# Patient Record
Sex: FEMALE | Race: WHITE | NOT HISPANIC OR LATINO | Employment: PART TIME | URBAN - METROPOLITAN AREA
[De-identification: names, ages, dates, MRNs, and addresses within clinical notes are randomized per-mention and may not be internally consistent; named-entity substitution may affect disease eponyms.]

---

## 2018-07-08 ENCOUNTER — OFFICE VISIT (OUTPATIENT)
Dept: URGENT CARE | Facility: CLINIC | Age: 38
End: 2018-07-08
Payer: COMMERCIAL

## 2018-07-08 VITALS
BODY MASS INDEX: 21.2 KG/M2 | SYSTOLIC BLOOD PRESSURE: 107 MMHG | RESPIRATION RATE: 18 BRPM | HEART RATE: 85 BPM | DIASTOLIC BLOOD PRESSURE: 68 MMHG | TEMPERATURE: 99 F | HEIGHT: 60 IN | OXYGEN SATURATION: 95 % | WEIGHT: 108 LBS

## 2018-07-08 DIAGNOSIS — M94.0 COSTOCHONDRITIS: ICD-10-CM

## 2018-07-08 DIAGNOSIS — Z76.89 ENCOUNTER TO ESTABLISH CARE: ICD-10-CM

## 2018-07-08 DIAGNOSIS — L02.411 CUTANEOUS ABSCESSES OF BOTH AXILLAE: Primary | ICD-10-CM

## 2018-07-08 DIAGNOSIS — L02.412 CUTANEOUS ABSCESSES OF BOTH AXILLAE: Primary | ICD-10-CM

## 2018-07-08 PROCEDURE — 3008F BODY MASS INDEX DOCD: CPT | Mod: CPTII,S$GLB,, | Performed by: PHYSICIAN ASSISTANT

## 2018-07-08 PROCEDURE — 99203 OFFICE O/P NEW LOW 30 MIN: CPT | Mod: S$GLB,,, | Performed by: PHYSICIAN ASSISTANT

## 2018-07-08 RX ORDER — SULFAMETHOXAZOLE AND TRIMETHOPRIM 800; 160 MG/1; MG/1
1 TABLET ORAL 2 TIMES DAILY
Qty: 20 TABLET | Refills: 0 | Status: SHIPPED | OUTPATIENT
Start: 2018-07-08 | End: 2018-07-13 | Stop reason: ALTCHOICE

## 2018-07-08 RX ORDER — LAMOTRIGINE 100 MG/1
100 TABLET ORAL DAILY
COMMUNITY
End: 2018-07-30 | Stop reason: SDUPTHER

## 2018-07-08 RX ORDER — MUPIROCIN 20 MG/G
OINTMENT TOPICAL
Qty: 22 G | Refills: 1 | Status: SHIPPED | OUTPATIENT
Start: 2018-07-08 | End: 2018-08-03

## 2018-07-08 NOTE — PATIENT INSTRUCTIONS
Abscess (Antibiotic Treatment Only)  An abscess (sometimes called a boil) happens when bacteria get trapped under the skin and start to grow. Pus forms inside the abscess as the body responds to the bacteria. An abscess can happen with an insect bite, ingrown hair, blocked oil gland, pimple, cyst, or puncture wound.  In the early stages, your wound may be red and tender. For this stage, you may get antibiotics. If the abscess does not get better with antibiotics, it will need to be drained with a small cut.  Home care  These tips will help you care for your abscess at home:  · Soak the wound in hot water or apply hot packs (small towel soaked in hot water) to the area for 20 minutes at a time. Do this 3 to 4 times a day.  · Do not cut, squeeze, or pop the boil yourself.  · Apply antibiotic cream or ointment to the skin 3 to 4 times a day, unless something else was prescribed. Some ointments include an antibiotic plus a pain reliever.  · If your doctor prescribed antibiotics, do not stop taking them until you have finished the medicine or the doctor tells you to stop.  · You may use an over-the-counter pain medicine to control pain, unless another pain medicine was prescribed. If you have chronic liver or kidney disease or ever had a stomach ulcer or gastrointestinal bleeding, talk with your doctor before using these any of these.  Follow-up care  Follow up with your healthcare provider, or as advised. Check your wound each day for the signs of worsening infection listed below.  When to seek medical advice  Get prompt medical attention if any of these occur:  · An increase in redness or swelling  · Red streaks in the skin leading away from the abscess  · An increase in local pain or swelling  · Fever of 100.4ºF (38ºC) or higher, or as directed by your healthcare provider  · Pus or fluid coming from the abscess  · Boil returns after getting better  Date Last Reviewed: 9/1/2016  © 6285-9032 [employer],  [employer]. [address] [place] [id number]. All rights reserved. This information is not intended as a substitute for professional medical care. Always follow your healthcare professional's instructions.        Costochondritis    Costochondritis is inflammation of a rib or the cartilage that connects a rib to your breastbone (sternum). It causes tenderness, and sometimes chest pain may be sharp or aching, or it may feel like pressure. Pain may get worse with deep breathing, movement, or exercise. In some cases, the pain is mistaken for a heart attack. Despite this, the condition is not serious. Read on to learn more about the condition and how it can be treated.  What causes costochondritis?  The cause of costochondritis is not completely clear, but it may happen after a chest injury, chest infection, or coughing episode. Some physical activities can sometimes lead to costochondritis. Large-breasted women may be more likely to have the condition. Often, the reason for the inflammation is unknown.  Diagnosing costochondritis  There is no test for costochondritis. The condition is diagnosed by the symptoms you have. Your healthcare provider will perform a physical exam. He or she will ask you about your symptoms and examine your chest for tenderness. In some cases, tests are done to rule out more serious problems. These tests may include imaging tests such as chest X-ray, CT scan, or an ECG.  Treating costochondritis  If an underlying cause is found, treatment for that will likely relieve the problem. Costochondritis often goes away on its own. The course of the condition varies from person to person. It usually lasts from weeks to months. In some cases, mild symptoms continue for months to years. To ease symptoms:  · Take medicine as directed. These relieve pain and swelling. Ibuprofen or other NSAIDs are often recommended. In some cases, you may be given prescription medicine, such as muscle relaxants.  · Avoid  activities that put stress on the chest or spine.  · Apply a heating pad (set to warm, not too high, heat) to the breastbone several times a day.  · Perform stretching exercises as directed.  Call the healthcare provider right away if you have any of the following:  · Pain that is not relieved by medicine  · Shortness of breath  · Lightheadedness, dizziness, or fainting  · Feeling of irregular heartbeat or fast pulse  Anyone with chest pain should see a healthcare provider, especially those who are older and may be at risk for heart disease.   Date Last Reviewed: 10/1/2016  © 8180-8527 [employer]. [address] [place] [address]. All rights reserved. This information is not intended as a substitute for professional medical care. Always follow your healthcare professional's instructions.      Please follow up with your Primary care provider within 2-5 days if your signs and symptoms have not resolved or worsen.     If your condition worsens or fails to improve we recommend that you receive another evaluation at the emergency room immediately or contact your primary medical clinic to discuss your concerns.   You must understand that you have received an Urgent Care treatment only and that you may be released before all of your medical problems are known or treated. You, the patient, will arrange for follow up care as instructed.     RED FLAGS/WARNING SYMPTOMS DISCUSSED WITH PATIENT THAT WOULD WARRANT EMERGENT MEDICAL ATTENTION. PATIENT VERBALIZED UNDERSTANDING.

## 2018-07-08 NOTE — PROGRESS NOTES
Subjective:       Patient ID: Greta Kumar is a 38 y.o. female.    Vitals:  height is 5' (1.524 m) and weight is 49 kg (108 lb). Her temperature is 98.6 °F (37 °C). Her blood pressure is 107/68 and her pulse is 85. Her respiration is 18 and oxygen saturation is 95%.     Chief Complaint: Rash (BILATERAL AXILLARY REGIONS) and Chest Pain    PT C/O PAINFUL RASHES ON THE BILATERAL AXILLARY REGIONS OF HER BODY, AS WELL AS CHEST PAIN.  THE RASHES STARTED ABOUT 1.5 WEEKS AGO, AND HAS BEEN GRADUALLY WORSENING.  THE CHEST PAIN HAS STARTED TODAY.  PT STATES HER  HAD A RECENT STAPH INFECTION WHICH HE PUT HIS BATHING SPONGE ON TOP OF THE BAR OF SOAP THAT THE PT HAS USED.      Rash   This is a new problem. The current episode started 1 to 4 weeks ago (1.5 WEEKS AGO). The problem has been gradually worsening since onset. The affected locations include the right axilla and left axilla. The rash is characterized by pain, redness and swelling. She was exposed to an ill contact. Pertinent negatives include no cough, fever, joint pain, shortness of breath, sore throat or vomiting. Past treatments include antibiotic cream. The treatment provided no relief. Her past medical history is significant for varicella. There is no history of allergies, asthma or eczema.   Chest Pain    This is a new problem. The current episode started today. The onset quality is gradual. The problem occurs constantly. The problem has been unchanged. The pain is present in the substernal region. The pain is at a severity of 5/10. The pain is moderate. Quality: aching pain. The pain does not radiate. Pertinent negatives include no abdominal pain, back pain, claudication, cough, diaphoresis, dizziness, exertional chest pressure, fever, headaches, hemoptysis, irregular heartbeat, leg pain, lower extremity edema, malaise/fatigue, nausea, near-syncope, numbness, orthopnea, palpitations, PND, shortness of breath, sputum production, syncope, vomiting or  weakness. The pain is aggravated by movement and deep breathing. She has tried nothing for the symptoms.     Review of Systems   Constitution: Negative for chills, diaphoresis, fever, weakness and malaise/fatigue.   HENT: Negative for hoarse voice and sore throat.    Eyes: Negative for blurred vision.   Cardiovascular: Positive for chest pain. Negative for claudication, irregular heartbeat, leg swelling, near-syncope, orthopnea, palpitations, paroxysmal nocturnal dyspnea and syncope.   Respiratory: Negative for cough, hemoptysis, shortness of breath and sputum production.    Skin: Positive for rash. Negative for itching.        painful   Musculoskeletal: Negative for back pain and joint pain.   Gastrointestinal: Negative for abdominal pain, nausea and vomiting.   Neurological: Negative for dizziness, headaches, light-headedness and numbness.   Psychiatric/Behavioral: The patient is not nervous/anxious.        Objective:      Physical Exam   Constitutional: She is oriented to person, place, and time. She appears well-developed and well-nourished. She is cooperative.  Non-toxic appearance. She does not appear ill. No distress.   HENT:   Head: Normocephalic and atraumatic. Head is without abrasion, without contusion and without laceration.   Right Ear: Hearing, tympanic membrane, external ear and ear canal normal.   Left Ear: Hearing, tympanic membrane, external ear and ear canal normal.   Nose: Nose normal. No mucosal edema, rhinorrhea or nasal deformity. No epistaxis. Right sinus exhibits no maxillary sinus tenderness and no frontal sinus tenderness. Left sinus exhibits no maxillary sinus tenderness and no frontal sinus tenderness.   Mouth/Throat: Uvula is midline, oropharynx is clear and moist and mucous membranes are normal. No trismus in the jaw. Normal dentition. No uvula swelling. No posterior oropharyngeal erythema.   Eyes: Conjunctivae, EOM and lids are normal. Pupils are equal, round, and reactive to light.  Right eye exhibits no discharge. Left eye exhibits no discharge. No scleral icterus.   Sclera clear bilat   Neck: Trachea normal, normal range of motion, full passive range of motion without pain and phonation normal. Neck supple.   Cardiovascular: Normal rate, regular rhythm, normal heart sounds, intact distal pulses and normal pulses.    Pulses:       Carotid pulses are 2+ on the right side, and 2+ on the left side.       Radial pulses are 2+ on the right side, and 2+ on the left side.        Popliteal pulses are 2+ on the right side, and 2+ on the left side.        Dorsalis pedis pulses are 2+ on the right side, and 2+ on the left side.   Pulmonary/Chest: Effort normal and breath sounds normal. No accessory muscle usage or stridor. No respiratory distress. She has no decreased breath sounds. She has no wheezes. She has no rhonchi. She has no rales. Chest wall is not dull to percussion. She exhibits tenderness and bony tenderness. She exhibits no mass, no laceration, no crepitus, no edema, no deformity, no swelling and no retraction.       Abdominal: Soft. Normal appearance and bowel sounds are normal. She exhibits no distension, no pulsatile midline mass and no mass. There is no tenderness.   Musculoskeletal: Normal range of motion. She exhibits no edema or deformity.   Neurological: She is alert and oriented to person, place, and time. She exhibits normal muscle tone. Coordination normal.   Skin: Skin is warm, dry and intact. Capillary refill takes less than 2 seconds. Lesion noted. No abrasion, no bruising, no burn, no ecchymosis, no laceration and no rash noted. She is not diaphoretic. There is erythema. No pallor.        Small areas of erythematous abscesses noted Bilateral axillas without drainage, oozing, or weeping. No edema noted.    Psychiatric: She has a normal mood and affect. Her speech is normal and behavior is normal. Judgment and thought content normal. Cognition and memory are normal.   Nursing  note and vitals reviewed.        XRAY: No acute cardiopulmonary processes noted    Assessment:       1. Cutaneous abscesses of both axillae    2. Costochondritis    3. Encounter to establish care        Plan:         Cutaneous abscesses of both axillae  -     sulfamethoxazole-trimethoprim 800-160mg (BACTRIM DS) 800-160 mg Tab; Take 1 tablet by mouth 2 (two) times daily. for 10 days  Dispense: 20 tablet; Refill: 0  -     mupirocin (BACTROBAN) 2 % ointment; Apply to affected area 3 times daily  Dispense: 22 g; Refill: 1  -     Ambulatory referral to Internal Medicine    Costochondritis  -     X-Ray Chest PA And Lateral; Future; Expected date: 07/08/2018    Encounter to establish care  -     Ambulatory referral to Internal Medicine        Abscess (Antibiotic Treatment Only)  An abscess (sometimes called a boil) happens when bacteria get trapped under the skin and start to grow. Pus forms inside the abscess as the body responds to the bacteria. An abscess can happen with an insect bite, ingrown hair, blocked oil gland, pimple, cyst, or puncture wound.  In the early stages, your wound may be red and tender. For this stage, you may get antibiotics. If the abscess does not get better with antibiotics, it will need to be drained with a small cut.  Home care  These tips will help you care for your abscess at home:  · Soak the wound in hot water or apply hot packs (small towel soaked in hot water) to the area for 20 minutes at a time. Do this 3 to 4 times a day.  · Do not cut, squeeze, or pop the boil yourself.  · Apply antibiotic cream or ointment to the skin 3 to 4 times a day, unless something else was prescribed. Some ointments include an antibiotic plus a pain reliever.  · If your doctor prescribed antibiotics, do not stop taking them until you have finished the medicine or the doctor tells you to stop.  · You may use an over-the-counter pain medicine to control pain, unless another pain medicine was prescribed. If you  have chronic liver or kidney disease or ever had a stomach ulcer or gastrointestinal bleeding, talk with your doctor before using these any of these.  Follow-up care  Follow up with your healthcare provider, or as advised. Check your wound each day for the signs of worsening infection listed below.  When to seek medical advice  Get prompt medical attention if any of these occur:  · An increase in redness or swelling  · Red streaks in the skin leading away from the abscess  · An increase in local pain or swelling  · Fever of 100.4ºF (38ºC) or higher, or as directed by your healthcare provider  · Pus or fluid coming from the abscess  · Boil returns after getting better  Date Last Reviewed: 9/1/2016  © 8848-0338 [employer]. [address] [place] [address], [place] [address]. All rights reserved. This information is not intended as a substitute for professional medical care. Always follow your healthcare professional's instructions.        Costochondritis    Costochondritis is inflammation of a rib or the cartilage that connects a rib to your breastbone (sternum). It causes tenderness, and sometimes chest pain may be sharp or aching, or it may feel like pressure. Pain may get worse with deep breathing, movement, or exercise. In some cases, the pain is mistaken for a heart attack. Despite this, the condition is not serious. Read on to learn more about the condition and how it can be treated.  What causes costochondritis?  The cause of costochondritis is not completely clear, but it may happen after a chest injury, chest infection, or coughing episode. Some physical activities can sometimes lead to costochondritis. Large-breasted women may be more likely to have the condition. Often, the reason for the inflammation is unknown.  Diagnosing costochondritis  There is no test for costochondritis. The condition is diagnosed by the symptoms you have. Your healthcare provider will perform a physical exam. He or she will  ask you about your symptoms and examine your chest for tenderness. In some cases, tests are done to rule out more serious problems. These tests may include imaging tests such as chest X-ray, CT scan, or an ECG.  Treating costochondritis  If an underlying cause is found, treatment for that will likely relieve the problem. Costochondritis often goes away on its own. The course of the condition varies from person to person. It usually lasts from weeks to months. In some cases, mild symptoms continue for months to years. To ease symptoms:  · Take medicine as directed. These relieve pain and swelling. Ibuprofen or other NSAIDs are often recommended. In some cases, you may be given prescription medicine, such as muscle relaxants.  · Avoid activities that put stress on the chest or spine.  · Apply a heating pad (set to warm, not too high, heat) to the breastbone several times a day.  · Perform stretching exercises as directed.  Call the healthcare provider right away if you have any of the following:  · Pain that is not relieved by medicine  · Shortness of breath  · Lightheadedness, dizziness, or fainting  · Feeling of irregular heartbeat or fast pulse  Anyone with chest pain should see a healthcare provider, especially those who are older and may be at risk for heart disease.   Date Last Reviewed: 10/1/2016  © 0010-2641 [employer]. [address] [place] [address], [place] [address]. All rights reserved. This information is not intended as a substitute for professional medical care. Always follow your healthcare professional's instructions.      Please follow up with your Primary care provider within 2-5 days if your signs and symptoms have not resolved or worsen.     If your condition worsens or fails to improve we recommend that you receive another evaluation at the emergency room immediately or contact your primary medical clinic to discuss your concerns.   You must understand that you have received an Urgent Care  treatment only and that you may be released before all of your medical problems are known or treated. You, the patient, will arrange for follow up care as instructed.     RED FLAGS/WARNING SYMPTOMS DISCUSSED WITH PATIENT THAT WOULD WARRANT EMERGENT MEDICAL ATTENTION. PATIENT VERBALIZED UNDERSTANDING.

## 2018-07-13 ENCOUNTER — OFFICE VISIT (OUTPATIENT)
Dept: INTERNAL MEDICINE | Facility: CLINIC | Age: 38
End: 2018-07-13
Payer: COMMERCIAL

## 2018-07-13 VITALS
HEART RATE: 60 BPM | HEIGHT: 60 IN | BODY MASS INDEX: 20.35 KG/M2 | WEIGHT: 103.63 LBS | DIASTOLIC BLOOD PRESSURE: 60 MMHG | SYSTOLIC BLOOD PRESSURE: 100 MMHG

## 2018-07-13 DIAGNOSIS — Z87.898 HISTORY OF SEIZURES: ICD-10-CM

## 2018-07-13 DIAGNOSIS — L73.2 HYDRADENITIS: Primary | ICD-10-CM

## 2018-07-13 PROCEDURE — 99203 OFFICE O/P NEW LOW 30 MIN: CPT | Mod: S$GLB,,, | Performed by: INTERNAL MEDICINE

## 2018-07-13 PROCEDURE — 99999 PR PBB SHADOW E&M-EST. PATIENT-LVL III: CPT | Mod: PBBFAC,,, | Performed by: INTERNAL MEDICINE

## 2018-07-13 PROCEDURE — 3008F BODY MASS INDEX DOCD: CPT | Mod: CPTII,S$GLB,, | Performed by: INTERNAL MEDICINE

## 2018-07-13 RX ORDER — CLINDAMYCIN HYDROCHLORIDE 300 MG/1
300 CAPSULE ORAL EVERY 8 HOURS
Qty: 30 CAPSULE | Refills: 0 | Status: SHIPPED | OUTPATIENT
Start: 2018-07-13 | End: 2018-07-23

## 2018-07-13 NOTE — PROGRESS NOTES
Pt. ID: Greta Kumar is a 38 y.o. female      Chief complaint:   Chief Complaint   Patient presents with    Mass       HPI: Pt. Here for B/L axillary lumps for past 3 weeks; she went to urgent care and was given bactrim and bactroban cream which does not help much; pt. , Driss, is with the pt.; LMP: birth control; pt. Does not want screening labs; pt. Has hx of seizures and she is on lamictal; she moved from [place] and she would like neurology in the area      Review of Systems   Constitutional: Negative for chills and fever.   Respiratory: Negative for cough and shortness of breath.    Cardiovascular: Negative for chest pain.   Gastrointestinal: Negative for nausea and vomiting.   Genitourinary: Negative for dysuria.   Skin:        Solitary lump to R axilla and multiple lumps to L axilla         Objective:    Physical Exam   Constitutional: She is oriented to person, place, and time.   Eyes: EOM are normal.   Neck: Normal range of motion.   Cardiovascular: Normal rate, regular rhythm and normal heart sounds.    Pulmonary/Chest: Effort normal and breath sounds normal.   Abdominal: Soft. There is no tenderness. There is no rebound and no guarding.   Musculoskeletal: Normal range of motion.   Neurological: She is alert and oriented to person, place, and time.   Skin: No rash noted.   R axilla: solitary, tender, marble size nodule to R axilla ; L axilla: multiple marble size, tender nodules; SHIRA snell Anna present during exam while pt. Was in a [facility]         Health Maintenance   Topic Date Due    Lipid Panel  1980    TETANUS VACCINE  02/22/1998    Pap Smear with HPV Cotest  02/22/2001    Influenza Vaccine  08/01/2018         Assessment:     1. Hydradenitis Active   2. History of seizures Well controlled         Plan: Hydradenitis  Comments:  start warm compresses and change bactrim to clindamycin x 10 days; refer to surgery   Orders:  -     Ambulatory consult to General Surgery  -      clindamycin (CLEOCIN) 300 MG capsule; Take 1 capsule (300 mg total) by mouth every 8 (eight) hours. for 10 days  Dispense: 30 capsule; Refill: 0    History of seizures  Comments:  continue current regimen and refer to neurology in the area  Orders:  -     Ambulatory consult to Neurology        Problem List Items Addressed This Visit        Neuro    History of seizures    Overview     continue current regimen and refer to neurology in the area         Relevant Orders    Ambulatory consult to Neurology       Derm    Hydradenitis - Primary    Relevant Medications    clindamycin (CLEOCIN) 300 MG capsule    Other Relevant Orders    Ambulatory consult to General Surgery

## 2018-07-30 ENCOUNTER — OFFICE VISIT (OUTPATIENT)
Dept: NEUROLOGY | Facility: CLINIC | Age: 38
End: 2018-07-30
Payer: COMMERCIAL

## 2018-07-30 ENCOUNTER — CLINICAL SUPPORT (OUTPATIENT)
Dept: CARDIOLOGY | Facility: CLINIC | Age: 38
End: 2018-07-30
Attending: PSYCHIATRY & NEUROLOGY
Payer: COMMERCIAL

## 2018-07-30 VITALS
SYSTOLIC BLOOD PRESSURE: 104 MMHG | HEIGHT: 60 IN | WEIGHT: 106.5 LBS | DIASTOLIC BLOOD PRESSURE: 67 MMHG | BODY MASS INDEX: 20.91 KG/M2 | HEART RATE: 70 BPM

## 2018-07-30 DIAGNOSIS — Z51.81 THERAPEUTIC DRUG MONITORING: ICD-10-CM

## 2018-07-30 DIAGNOSIS — I80.9 PHLEBITIS: ICD-10-CM

## 2018-07-30 DIAGNOSIS — Z87.898 HISTORY OF SEIZURES: ICD-10-CM

## 2018-07-30 DIAGNOSIS — G40.909 SEIZURE DISORDER: ICD-10-CM

## 2018-07-30 DIAGNOSIS — I80.8 PHLEBITIS OF RIGHT UPPER EXTREMITY: Primary | ICD-10-CM

## 2018-07-30 DIAGNOSIS — F32.A ANXIETY AND DEPRESSION: ICD-10-CM

## 2018-07-30 DIAGNOSIS — I80.9 PHLEBITIS: Primary | ICD-10-CM

## 2018-07-30 DIAGNOSIS — F41.9 ANXIETY AND DEPRESSION: ICD-10-CM

## 2018-07-30 PROCEDURE — 99205 OFFICE O/P NEW HI 60 MIN: CPT | Mod: S$GLB,,, | Performed by: PSYCHIATRY & NEUROLOGY

## 2018-07-30 PROCEDURE — 99999 PR PBB SHADOW E&M-EST. PATIENT-LVL III: CPT | Mod: PBBFAC,,, | Performed by: PSYCHIATRY & NEUROLOGY

## 2018-07-30 PROCEDURE — 93971 EXTREMITY STUDY: CPT | Mod: S$GLB,,, | Performed by: INTERNAL MEDICINE

## 2018-07-30 PROCEDURE — 3008F BODY MASS INDEX DOCD: CPT | Mod: CPTII,S$GLB,, | Performed by: PSYCHIATRY & NEUROLOGY

## 2018-07-30 RX ORDER — LAMOTRIGINE 100 MG/1
100 TABLET ORAL DAILY
Qty: 90 TABLET | Refills: 3 | Status: SHIPPED | OUTPATIENT
Start: 2018-07-30 | End: 2019-08-22 | Stop reason: SDUPTHER

## 2018-07-30 RX ORDER — CITALOPRAM 20 MG/1
20 TABLET, FILM COATED ORAL DAILY
Qty: 30 TABLET | Refills: 11 | Status: SHIPPED | OUTPATIENT
Start: 2018-07-30 | End: 2018-08-03

## 2018-07-30 NOTE — PROGRESS NOTES
[facility] NEUROLOGY  [facility] [place]    Date: 7/30/18  Patient Name: Greta Kumar   MRN: [id number]   PCP: Primary Doctor No  Referring Provider: Self, Aaareferral    Assessment:      This is Greta Kumar, 38 y.o. female who presents with right upper extremity pain and palpable cord on exam in the setting of hidradenitis suppurative a.  Will obtain upper extremity ultrasound to rule out phlebitis.  Have urged to follow up with her primary care physician.  Patient does not have weakness on exam suggestive of underlying plexus injury.  Even a plexus Sharad were to be considered, patient has been only symptomatic for a week limiting utility of EMG.  Will attempt to obtain outside records for MRI and EEG reports.  Will make no changes to her current Lamictal today and obtain baseline level.     Plan:      Problem List Items Addressed This Visit        Neuro    History of seizures    Current Assessment & Plan     -- continue current lamictal 100 mg daily  -- lamictal level and routine labs today  -- obtaining OSH EEG and MRI records              Psychiatric    Anxiety and depression    Current Assessment & Plan     Trial of celexa 20 mg daily           Other Visit Diagnoses     Phlebitis of right upper extremity    -  Primary    Relevant Orders    Ultrasound doppler venous arm right (CUPID ONLY)    Ultrasound doppler arterial arm right (CUPID ONLY)    VAS US Arterial Arm Right    VAS US Venous Arm Left    Seizure disorder        Relevant Orders    Lamotrigine level    CBC auto differential    Comprehensive metabolic panel    Therapeutic drug monitoring        Relevant Orders    Lamotrigine level    CBC auto differential    Comprehensive metabolic panel        I completed education on seizure first aid and safety. I recommended seizure precautions with regards to avoiding unsupervised water recreational activity or bathing in tubs, climbing or working at heights, operation of heavy or  dangerous machinery, caution around fire and sources of high heat, as well as any other activity which could put a patient at danger in case of a seizure.  I also reviewed the [facility] law and recommended that the patient not drive for 6 months in the event of a breakthrough seizure.    As the patient is a female of childbearing age, I have counseled the patient on issues pertaining to pregnancy and epilepsy and recommended folic acid supplementation. Patient currently has a Mirena.    Sergio Tenorio MD  [facility]   Department of Neurology    Patient note was created using Dragon Dictation.  Any errors in syntax or even information may not have been identified and edited on initial review prior to signing this note.  Subjective:        HPI:   Ms. Greta Kumar is a 38 y.o. female who presents with a chief complaint of right arm pain and history of seizures.  Patient presents today with her  who contributes to the history.  The patient reports that approximately a week ago she was diagnosed with hidradenitis suppurative of.  She states that she believes she has developed nerve damage in her right upper extremity as result of the hidradenitis suppurativa.  She reports point tenderness made worse with movement, erythema, and prominent, palpable  blood vessels in her axilla since her diagnosis. She does state that occasionally she experiences a burning and tingling pain in patchy areas over her right medial axilla and biceps.  She states that she was recommended to undergo surgery for treatment of her HS but has declined this.  She also reports a longstanding history of epilepsy stating that she was diagnosed at age 14 and has experienced infrequent generalized tonic-clonic seizures since that time.  She states she has been seizure-free for the last 2 years and states she is well controlled currently on Lamictal    Seizure Type: Unknown  Seizure Etiology: Unknown  Current AEDs: Lamictal 100 mg  "daily    The patient is accompanied by family who contribute to the history. This patient has 1 types of seizure as described below. The patient reports having seizures for years. The patient reports to have  seizure control. The seizure frequency is ~1 every 1-2 years. The last seizure was 2 years ago . The patient reports no side effects from seizure medication.     Seizure Type 1:   Seizure Description: Generalized shaking, looks like gasping for air, last 30s-minute, may 1-2  Aura: "Feel spacey" "nothing make sense"  Associated Symptoms: Bites tongue, urinary incontinence  Seizure Frequency: Monthly, prior that 1/yr  Last seizure: 2 years ago    Handedness: Left  Seizure Triggers/ Provoking Features: stress   Seizure Onset Age: 14  Seizure/ Epilepsy Risk Factors: Paternal grandmother has epilepsy  Birth/Developmental History: Normal birth and developmental history  Previous Seizure Medications: PB, PHT, CBZ, LTG, LEV  Other Treatments: None  Episodes of Status: None  Recent Med Changes: None  Psychiatric/Behavioral Comorbitidies: None  Surgical Candidacy: None    Prior Studies:  EEG : Ambulatory EEG done 2 years- normal per patient  vEEG/ EMU evaluation: Not done  MRI of brain: Done per patient, normal per patient  Other studies: Not done  AED levels: Not done  CT/CTA Scan:  Not dome  PET Scan: Not done  Neuropsychological Evaluation: Not done  DEXA Scan: Not done     PAST MEDICAL HISTORY:  Past Medical History:   Diagnosis Date    Epilepsy     Seizures        PAST SURGICAL HISTORY:  History reviewed. No pertinent surgical history.    CURRENT MEDS:  Current Outpatient Prescriptions   Medication Sig Dispense Refill    lamoTRIgine (LAMICTAL) 100 MG tablet Take 100 mg by mouth once daily.      mupirocin (BACTROBAN) 2 % ointment Apply to affected area 3 times daily 22 g 1     No current facility-administered medications for this visit.        ALLERGIES:  Review of patient's allergies indicates:   Allergen " Reactions    Penicillins Hives       FAMILY HISTORY:  Family History   Problem Relation Age of Onset    Emphysema Mother     Epilepsy Paternal Grandmother     Cancer Paternal Grandmother        SOCIAL HISTORY:  Social History   Substance Use Topics    Smoking status: Never Smoker    Smokeless tobacco: Never Used    Alcohol use No       Review of Systems:  12 review of systems is negative except for the symptoms mentioned in HPI.        Objective:     Vitals:    07/30/18 0831   BP: 104/67   Pulse: 70   Weight: 48.3 kg (106 lb 7.7 oz)   Height: 5' (1.524 m)       General: NAD, well nourished   Eyes: no tearing, discharge, no erythema   ENT: moist mucous membranes of the oral cavity, nares patent    Neck: Supple, Full range of motion  Cardiovascular: Warm and well perfused, pulses equal and symmetrical  Lungs: Normal work of breathing, normal chest wall excursions  Skin: No rash, lesions, or breakdown on exposed skin  Psychiatry: Mood and affect are appropriate   Abdomen: soft, non tender, non distended  Extremeties: No cyanosis, clubbing or edema. Palpable cord in RUE.     Neurological   MENTAL STATUS: Alert and oriented to person, place, and time. Attention and concentration within normal limits. Speech without dysarthria, able to name and repeat without difficulty. Recent and remote memory within normal limits   CRANIAL NERVES: Visual fields intact. PERRL. EOMI. Facial sensation intact. Face symmetrical. Hearing grossly intact. Full shoulder shrug bilaterally. Tongue protrudes midline   SENSORY: Sensation is intact to light touch throughout.   MOTOR: Normal bulk and tone. No pronator drift.  5/5 deltoid, biceps, triceps, interosseous, hand  bilaterally. 5/5 iliopsoas, knee extension/flexion, foot dorsi/plantarflexion bilaterally.    REFLEXES: Symmetric and 2+ throughout. Toes down going bilaterally.   CEREBELLAR/COORDINATION/GAIT: Gait steady with normal arm swing and stride length.  Heel to shin intact.  Finger to nose intact. Normal rapid alternating movements.

## 2018-07-30 NOTE — PATIENT INSTRUCTIONS
First Aid: Seizures  A seizure results from a sudden rush of abnormal electrical signals in the brain. Symptoms may range from a minor daze to uncontrollable muscle spasms (convulsions). In many cases, the victim will lose consciousness. A seizure can be caused by a high fever, head injury, drug reaction, or condition such as epilepsy.  1. Protect the head  · Help the victim to the floor if he or she begins losing muscle control. Turn the person on his or her side to prevent choking.  · Protect the victim's head from injury by placing something soft, such as folded clothes, beneath it, and by moving objects away from the victim.  · Don't cause injury by restraining the person or by placing anything in his or her mouth.  · Remove eyeglasses.  2.  Preserve dignity  · Clear away bystanders.  · Reassure the victim, who may be confused, drowsy, or hostile when coming out of the seizure.  · Cover the person or provide dry clothes if muscle spasms have caused a loss of bladder control.  3. Check for injury  · Make sure the victim's mental state has returned to normal. One way to do this is to ask the person his or her name, the year, and your location.  · Injuries can occur to the head, mouth, tongue, or body.   4. Call 911  · If the seizure lasts longer than five minutes (Note: Timing the seizure and recovery time is helpful in many cases.)  · If a second seizure occurs  · If the victim doesnt regain consciousness  · If the victim is pregnant  · If the victim has no history of seizures  · If the person has sustained an injury during the seizure. (Note: If the injury is not severe or life threatening, it may be more appropriate to seek treatment with the primary care provider.)  Date Last Reviewed: 10/19/2015  © 1566-7142 [employer]. [address] [place] [address], [place] [address]. All rights reserved. This information is not intended as a substitute for professional medical care. Always follow your healthcare  professional's instructions.

## 2018-07-30 NOTE — ASSESSMENT & PLAN NOTE
-- continue current lamictal 100 mg daily  -- lamictal level and routine labs today  -- obtaining OSH EEG and MRI records

## 2018-08-03 ENCOUNTER — OFFICE VISIT (OUTPATIENT)
Dept: INTERNAL MEDICINE | Facility: CLINIC | Age: 38
End: 2018-08-03
Payer: COMMERCIAL

## 2018-08-03 VITALS
SYSTOLIC BLOOD PRESSURE: 114 MMHG | HEIGHT: 60 IN | OXYGEN SATURATION: 99 % | WEIGHT: 104.5 LBS | BODY MASS INDEX: 20.52 KG/M2 | DIASTOLIC BLOOD PRESSURE: 60 MMHG | HEART RATE: 63 BPM

## 2018-08-03 DIAGNOSIS — L73.2 HYDRADENITIS: ICD-10-CM

## 2018-08-03 DIAGNOSIS — F41.9 ANXIETY: ICD-10-CM

## 2018-08-03 DIAGNOSIS — Z87.898 HISTORY OF SEIZURES: ICD-10-CM

## 2018-08-03 DIAGNOSIS — I80.9 PHLEBITIS: Primary | ICD-10-CM

## 2018-08-03 PROCEDURE — 99999 PR PBB SHADOW E&M-EST. PATIENT-LVL III: CPT | Mod: PBBFAC,,, | Performed by: INTERNAL MEDICINE

## 2018-08-03 PROCEDURE — 3008F BODY MASS INDEX DOCD: CPT | Mod: CPTII,S$GLB,, | Performed by: INTERNAL MEDICINE

## 2018-08-03 PROCEDURE — 99214 OFFICE O/P EST MOD 30 MIN: CPT | Mod: S$GLB,,, | Performed by: INTERNAL MEDICINE

## 2018-08-03 RX ORDER — ETODOLAC 400 MG/1
400 TABLET, EXTENDED RELEASE ORAL DAILY PRN
Qty: 30 TABLET | Refills: 1 | Status: SHIPPED | OUTPATIENT
Start: 2018-08-03 | End: 2023-05-17

## 2018-08-03 RX ORDER — ESCITALOPRAM OXALATE 10 MG/1
10 TABLET ORAL DAILY
Qty: 30 TABLET | Refills: 1 | Status: SHIPPED | OUTPATIENT
Start: 2018-08-03 | End: 2023-05-17

## 2018-08-03 NOTE — LETTER
August 3, 2018      Whitley Hull PA-C  [address] [facility] [address]           [facility] Internal Medicine  [address]  [place] [address]  Phone: [phone]  Fax: [phone]          Patient: Greta Kumar   MR Number: [id number]   YOB: 1980   Date of Visit: 8/3/2018       Dear Whitley Hull:    Thank you for referring Greta Kumar to me for evaluation. Attached you will find relevant portions of my assessment and plan of care.    If you have questions, please do not hesitate to call me. I look forward to following Greta Kumar along with you.    Sincerely,    Antonio Brizuela MD    Enclosure  CC:  No Recipients    If you would like to receive this communication electronically, please contact [email or web address] or [phone] to request more information on Private.Me Link access.    For providers and/or their staff who would like to refer a patient to [facility], please contact us through our one-stop-shop provider referral line, [facility], at [phone].    If you feel you have received this communication in error or would no longer like to receive these types of communications, please e-mail [email or web address]

## 2018-08-03 NOTE — PROGRESS NOTES
Pt. ID: Greta Kumar is a 38 y.o. female      Chief complaint:   Chief Complaint   Patient presents with    Annual Exam    Establish Care       HPI: Pt. Here for f/u for hydradenitis; pt. , Driss, is with the pt.; pt. Has completed antbx and hydradenitis has resolved to B/L axilla; of note, pt. Has developed phlebitis to R upper arm from R axilla to mid arm which has been progressively migrating down arm; pt. Had venous US of R upper arm which was negative for DVT;  pt. Has f/u neurology for seizure hx to establish and was told to continue lamictal; pt. Was also started on celexa for anxiety/depression; she could not tolerate celexa and would like an alternative; she denies depression or suicidal ideation; she reports only anxiety; I reviewed labs dated 7/30/18 which showed no significant abnormality; cautioned on pregnancy risk with meds; LMP: currently    Review of Systems   Constitutional: Negative for chills and fever.   Respiratory: Negative for cough and shortness of breath.    Cardiovascular: Negative for chest pain.   Gastrointestinal: Negative for abdominal pain, nausea and vomiting.   Genitourinary: Negative for dysuria.   Skin:        B/L axillary hydradentis resolved; palpable cord noted from R axilla down to R mid arm   Psychiatric/Behavioral: The patient is nervous/anxious.          Objective:    Physical Exam   Constitutional: She is oriented to person, place, and time. She appears well-developed.   Eyes: EOM are normal.   Neck: Normal range of motion.   Cardiovascular: Normal rate, regular rhythm and normal heart sounds.    Pulmonary/Chest: Effort normal and breath sounds normal.   Abdominal: Soft. There is no tenderness. There is no rebound and no guarding.   Musculoskeletal: Normal range of motion.   Neurological: She is alert and oriented to person, place, and time.   Skin: No rash noted.   Palpable venous cord from R axilla to R mid arm; resolved B/L axillary hydradenitis   Vitals  reviewed.        Health Maintenance   Topic Date Due    Lipid Panel  1980    TETANUS VACCINE  02/22/1998    Pap Smear with HPV Cotest  02/22/2001    Influenza Vaccine  08/01/2018         Assessment:     1. Phlebitis Active   2. Anxiety Sub-optimally controlled   3. History of seizures Well controlled   4. Hydradenitis Well controlled         Plan: Phlebitis  Comments:  asked pt. to start warm compress to site and start lodine; re-evalute in 1 month; asked pt. to contact me if plebitis continues to migrate for vascular referal   Orders:  -     etodolac (LODINE XL) 400 MG 24 hr tablet; Take 1 tablet (400 mg total) by mouth daily as needed (avoid all other NSAIDs).  Dispense: 30 tablet; Refill: 1    Anxiety  Comments:  start lexapro and re-evaluate in 1 month   Orders:  -     escitalopram oxalate (LEXAPRO) 10 MG tablet; Take 1 tablet (10 mg total) by mouth once daily.  Dispense: 30 tablet; Refill: 1    History of seizures  Comments:  continue current regimen and f/u neurology who is managing     Hydradenitis  Comments:  resolved, monitor         Problem List Items Addressed This Visit        Neuro    History of seizures       Psychiatric    Anxiety    Relevant Medications    escitalopram oxalate (LEXAPRO) 10 MG tablet       Derm    Hydradenitis       Hematology    Phlebitis - Primary    Relevant Medications    etodolac (LODINE XL) 400 MG 24 hr tablet

## 2019-08-22 RX ORDER — LAMOTRIGINE 100 MG/1
100 TABLET ORAL DAILY
Qty: 90 TABLET | Refills: 0 | Status: SHIPPED | OUTPATIENT
Start: 2019-08-22 | End: 2019-11-18 | Stop reason: SDUPTHER

## 2019-11-17 ENCOUNTER — PATIENT MESSAGE (OUTPATIENT)
Dept: NEUROLOGY | Facility: CLINIC | Age: 39
End: 2019-11-17

## 2019-11-18 ENCOUNTER — PATIENT MESSAGE (OUTPATIENT)
Dept: NEUROLOGY | Facility: CLINIC | Age: 39
End: 2019-11-18

## 2019-11-18 RX ORDER — LAMOTRIGINE 100 MG/1
100 TABLET ORAL DAILY
Qty: 90 TABLET | Refills: 0 | Status: SHIPPED | OUTPATIENT
Start: 2019-11-18 | End: 2020-02-13 | Stop reason: SDUPTHER

## 2020-02-12 ENCOUNTER — PATIENT MESSAGE (OUTPATIENT)
Dept: NEUROLOGY | Facility: CLINIC | Age: 40
End: 2020-02-12

## 2020-02-13 ENCOUNTER — PATIENT MESSAGE (OUTPATIENT)
Dept: NEUROLOGY | Facility: CLINIC | Age: 40
End: 2020-02-13

## 2020-02-13 RX ORDER — LAMOTRIGINE 100 MG/1
100 TABLET ORAL DAILY
Qty: 90 TABLET | Refills: 0 | Status: SHIPPED | OUTPATIENT
Start: 2020-02-13 | End: 2020-04-03 | Stop reason: SDUPTHER

## 2020-03-25 RX ORDER — LAMOTRIGINE 100 MG/1
100 TABLET ORAL DAILY
Qty: 90 TABLET | Refills: 1 | OUTPATIENT
Start: 2020-03-25

## 2020-04-02 NOTE — PROGRESS NOTES
EPILEPSY CLINIC:   FOLLOW UP VISIT   - patient of     The patient location is: home  The chief complaint leading to consultation is: seizures  Visit type: Virtual visit with synchronous audio and video  Total time spent with patient: 20 minutes  Each patient to whom he or she provides medical services by telemedicine is:  (1) informed of the relationship between the physician and patient and the respective role of any other health care provider with respect to management of the patient; and (2) notified that he or she may decline to receive medical services by telemedicine and may withdraw from such care at any time.    Name: Greta Lyon  MRN:[id number]   CSN: [id number]    Date of service: 4/2/2020  Last clinic visit: 7/30/2018 -     Age:40 y.o.   Gender:female     The patient is here today alone  History obtained from patient    CHIEF COMPLAINT: follow up of seizures    INTERVAL HISTORY (Since last visit):    This is a 40 y.o. 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    Seizure log since last clinic visit:  - last sz ~ 1 week ago during sleep - woke off with tongue bitten, states it was a 'minor' seizure but feels that it is related to overall stress; no hx of any missed doses   - unwitnessed; says that her  sleeps very soundly and never knows when she has a seizure  - last one prior has been at least 3 years ago    Current AED:   - LTG 100mg q day - compliant with no side-effect    Notes from last clinic visit with , 7/30/18:  HPI: Ms. Greta Kumar is a 38 y.o. female who presents with a chief complaint of right arm pain and history of seizures.  Patient presents today with her  who contributes to the history.  The patient  "reports that approximately a week ago she was diagnosed with hidradenitis suppurative of.  She states that she believes she has developed nerve damage in her right upper extremity as result of the hidradenitis suppurativa.  She reports point tenderness made worse with movement, erythema, and prominent, palpable  blood vessels in her axilla since her diagnosis. She does state that occasionally she experiences a burning and tingling pain in patchy areas over her right medial axilla and biceps.  She states that she was recommended to undergo surgery for treatment of her HS but has declined this.  She also reports a longstanding history of epilepsy stating that she was diagnosed at age 14 and has experienced infrequent generalized tonic-clonic seizures since that time.  She states she has been seizure-free for the last 2 years and states she is well controlled currently on Lamictal     Seizure Type: Unknown  Seizure Etiology: Unknown  Current AEDs: Lamictal 100 mg daily     The patient is accompanied by family who contribute to the history. This patient has 1 types of seizure as described below. The patient reports having seizures for years. The patient reports to have  seizure control. The seizure frequency is ~1 every 1-2 years. The last seizure was 2 years ago . The patient reports no side effects from seizure medication.     Seizure Type 1:   Seizure Description: Generalized shaking, looks like gasping for air, last 30s-minute, may 1-2  Aura: "Feel spacey" "nothing make sense"  Associated Symptoms: Bites tongue, urinary incontinence  Seizure Frequency: Monthly, prior that 1/yr  Last seizure: 2 years ago    Seizure Triggers/ Provoking Features: stress     RELEVANT LABS AND TESTS SINCE LAST VISIT:   Prior Studies:  EEG : Ambulatory EEG done 2 years- normal per patient  vEEG/ EMU evaluation: Not done  MRI of brain: Done per patient, normal per patient  Other studies: Not done  AED levels: Not done  CT/CTA Scan:  Not " dome  PET Scan: Not done  Neuropsychological Evaluation: Not done  DEXA Scan: Not done    RISK FACTORS FOR SEIZURES:    1. Head Trauma:    2. CNS Infections:    3. CNS Tumors:      4. CNS Vascular Disease:  5. Febrile Seizures:    6. Developmental Delay: No       7. Family History of Seizures: Yes  - paternal grandmother has epilepsy  8. Birth history: FTNVD    Pregnancy/Labor/Delivery:     CURRENT MEDICATIONS:   Current Outpatient Medications   Medication Sig Dispense Refill    escitalopram oxalate (LEXAPRO) 10 MG tablet Take 1 tablet (10 mg total) by mouth once daily. 30 tablet 1    etodolac (LODINE XL) 400 MG 24 hr tablet Take 1 tablet (400 mg total) by mouth daily as needed (avoid all other NSAIDs). 30 tablet 1    lamoTRIgine (LAMICTAL) 100 MG tablet Take 1 tablet (100 mg total) by mouth once daily. NEEDS APT FOR FURTHER REFILL 90 tablet 0     No current facility-administered medications for this visit.       CURRENT ANTI EPILEPTIC MEDICATIONS:  See hpi    VAGAL NERVE STIMULATOR: n/a    PRIOR ANTICONVULSANT HISTORY:   - PB, PHT, CBZ, LEV    PAST MEDICAL HISTORY:   Active Ambulatory Problems     Diagnosis Date Noted    Hydradenitis 07/13/2018    History of seizures 07/13/2018    Anxiety and depression 07/30/2018    Anxiety 08/03/2018    Phlebitis 08/03/2018     Resolved Ambulatory Problems     Diagnosis Date Noted    No Resolved Ambulatory Problems     Past Medical History:   Diagnosis Date    Epilepsy     Seizures       PAST PSYCHIATRIC HISTORY:  none     PAST SURGICAL HISTORY including Epilepsy surgery: No past surgical history on file.     FAMILY HISTORY:   Family History   Problem Relation Age of Onset    Emphysema Mother     Epilepsy Paternal Grandmother     Cancer Paternal Grandmother          SOCIAL HISTORY:   Social History     Socioeconomic History    Marital status:      Spouse name: Not on file    Number of children: Not on file    Years of education: Not on file    Highest  education level: Not on file   Occupational History    Not on file   Social Needs    Financial resource strain: Not on file    Food insecurity:     Worry: Not on file     Inability: Not on file    Transportation needs:     Medical: Not on file     Non-medical: Not on file   Tobacco Use    Smoking status: Never Smoker    Smokeless tobacco: Never Used   Substance and Sexual Activity    Alcohol use: No    Drug use: No    Sexual activity: Not Currently   Lifestyle    Physical activity:     Days per week: Not on file     Minutes per session: Not on file    Stress: Not on file   Relationships    Social connections:     Talks on phone: Not on file     Gets together: Not on file     Attends Temple service: Not on file     Active member of club or organization: Not on file     Attends meetings of clubs or organizations: Not on file     Relationship status: Not on file   Other Topics Concern    Not on file   Social History Narrative    Not on file      a) Marital status:                                                  b) Living situation:   c) Employed/Unemployed/Other: works in a 's office    DRIVING HISTORY:  Currently driving:     LEVEL OF EDUCATION:     SUBSTANCE USE:    ALLERGIES: Penicillins     REVIEW OF SYSTEMS:  Review of Systems     GENERAL EXAMINATION:  There were no vitals taken for this visit.     GENERAL EXAMINATION:  General Appearance:    This is an average built female who appears well.  HEENT: There are no dysmorphic features    NEUROLOGICAL EXAMINATION:  Mental status: Alert and oriented   Memory: Recent memory intact, Remote memory intact, Age correct, Month correct  Attention and concentration: intact  Fund of knowledge: adequate  Speech: normal  Dysarthria: No   Eyes: PERRL; EOM intact; No nystagmus.The visual pursuits  were smooth with normal saccadic eye movements.  No facial asymmetry.   Hearing was intact bilaterally  Motor examination: not assessed  Sensory examination: not  assessed   Gait: not assessed    IMPRESSION:  The patient's history is consistent with:  Seizure disorder  37yo F with hx of seizures x age 14 years    - no seizures x ~ 3 years but ?had one during sleep ~ 1 week ago: no identifiable triggers except situational stress (COVID-19)    Current AED: LTG 100mg q day    Plan:  - continue LTG 100mg q day for now   - if any further seizures, check LTG level and will need to increase dose to 150mg q day    Seizure log  Seizure precautions/restrictions    Plan of care was discussed in detail with patient      The patient was asked to call me/the clinic 1 week after the test(s) are done to obtain results.    More than 50% of the 30 minutes spent with the patient (as well as family/caregiver(s) was spent on face-to-face counseling about:  1. Diagnosis, plans, prognosis, medications and possible side-effects, risks and benefits of treatment, other alternatives to AEDs.  2. Risks related to continued seizures, status epilepticus, SUDEP, driving restrictions and seizure precautions ( no baths but showers are ok, no swimming unsupervised, no use of heavy machinery, no use of sharp moving objects, avoid heights).   3. Issues related to pregnancy, OCP and breast feeding as it relates to epilepsy (in female patients).  4. The potential of teratogenicity (for female patients) and suicidal risks of anti-epileptic medications.  5.Avoid any activity that compromise patient safety related to seizures.    Questions and concerns raised by the patient and family/care-giver(s) were addressed and they indicated understanding of everything discussed and agreed to plans as above.    Return in 4 months or earlier prn - follow up with Dr.Davis Hanane Campuzano MD, KYLAH(), FACNS, FAES.  Neurology-Epilepsy.  [facility].

## 2020-04-03 ENCOUNTER — OFFICE VISIT (OUTPATIENT)
Dept: NEUROLOGY | Facility: CLINIC | Age: 40
End: 2020-04-03
Payer: COMMERCIAL

## 2020-04-03 DIAGNOSIS — G40.909 SEIZURE DISORDER: Primary | ICD-10-CM

## 2020-04-03 PROCEDURE — 99213 PR OFFICE/OUTPT VISIT, EST, LEVL III, 20-29 MIN: ICD-10-PCS | Mod: 95,,, | Performed by: PSYCHIATRY & NEUROLOGY

## 2020-04-03 PROCEDURE — 99213 OFFICE O/P EST LOW 20 MIN: CPT | Mod: 95,,, | Performed by: PSYCHIATRY & NEUROLOGY

## 2020-04-03 RX ORDER — LAMOTRIGINE 100 MG/1
100 TABLET ORAL DAILY
Qty: 90 TABLET | Refills: 2 | Status: SHIPPED | OUTPATIENT
Start: 2020-04-03 | End: 2020-11-12 | Stop reason: SDUPTHER

## 2020-04-07 PROBLEM — G40.909 SEIZURE DISORDER: Status: ACTIVE | Noted: 2020-04-07

## 2020-04-07 NOTE — ASSESSMENT & PLAN NOTE
39yo F with hx of seizures x age 14 years    - no seizures x ~ 3 years but ?had one during sleep ~ 1 week ago: no identifiable triggers except situational stress (COVID-19)    Current AED: LTG 100mg q day    Plan:  - continue LTG 100mg q day for now   - if any further seizures, check LTG level and will need to increase dose to 150mg q day    Seizure log  Seizure precautions/restrictions    Plan of care was discussed in detail with patient

## 2020-11-11 ENCOUNTER — TELEPHONE (OUTPATIENT)
Dept: NEUROLOGY | Facility: CLINIC | Age: 40
End: 2020-11-11

## 2020-11-11 NOTE — TELEPHONE ENCOUNTER
Returned call, spoke with patient.  Virtual appointment scheduled for 12/14/20.  She attempted to schedule with Dr Tenorio during the pandemic, due to Dr Tenorio being floated to COVID floor she was unable and saw Dr Campuzano.  Placed on waiting list for sooner appointment.  She voiced understanding.

## 2020-11-11 NOTE — TELEPHONE ENCOUNTER
----- Message from Viola Ballesteros sent at 11/11/2020 12:32 PM CST -----  Regarding: Appt  Pt is calling to speak with Staff regarding an appt for a check up and medication refill, however, the first available is not until 3/4/21.   is requesting Staff make contact with her to  an earlier appt.    She can be reached at [phone].    Thank you.

## 2020-11-12 ENCOUNTER — OFFICE VISIT (OUTPATIENT)
Dept: NEUROLOGY | Facility: CLINIC | Age: 40
End: 2020-11-12
Payer: COMMERCIAL

## 2020-11-12 DIAGNOSIS — G47.00 INSOMNIA, UNSPECIFIED TYPE: Primary | ICD-10-CM

## 2020-11-12 DIAGNOSIS — G40.909 SEIZURE DISORDER: ICD-10-CM

## 2020-11-12 PROCEDURE — 99214 PR OFFICE/OUTPT VISIT, EST, LEVL IV, 30-39 MIN: ICD-10-PCS | Mod: 95,,, | Performed by: PSYCHIATRY & NEUROLOGY

## 2020-11-12 PROCEDURE — 99214 OFFICE O/P EST MOD 30 MIN: CPT | Mod: 95,,, | Performed by: PSYCHIATRY & NEUROLOGY

## 2020-11-12 RX ORDER — LAMOTRIGINE 100 MG/1
100 TABLET ORAL DAILY
Qty: 90 TABLET | Refills: 3 | Status: SHIPPED | OUTPATIENT
Start: 2020-11-12 | End: 2021-12-16 | Stop reason: SDUPTHER

## 2020-11-12 RX ORDER — TALC
3 POWDER (GRAM) TOPICAL NIGHTLY
Qty: 90 TABLET | Refills: 3 | Status: SHIPPED | OUTPATIENT
Start: 2020-11-12

## 2020-11-12 NOTE — PROGRESS NOTES
[facility] Department of Neurology  Virtual Visit      [facility] NEUROLOGY  [facility], [place]    Date: 11/12/20  Patient Name: Greta Lyon   MRN: [id number]   PCP: Antonio Brizuela (Inactive)  Referring Provider: No ref. provider found    The patient location is: Home  The chief complaint leading to consultation is: Epilepsy  Visit type: Virtual visit with synchronous audio and video  Total time spent with patient: 8 minutes  Each patient to whom he or she provides medical services by telemedicine is:  (1) informed of the relationship between the physician and patient and the respective role of any other health care provider with respect to management of the patient; and (2) notified that he or she may decline to receive medical services by telemedicine and may withdraw from such care at any time.    Notes:     Assessment:   Greta Lyon is a 40 y.o. female presenting presenting in follow-up for epilepsy.  Will continue Lamictal with no changes.  Initiating melatonin for management of insomnia. LTG level 3.2 at last visit.   Plan:     Problem List Items Addressed This Visit        Neuro    Seizure disorder    Relevant Medications    lamoTRIgine (LAMICTAL) 100 MG tablet      Other Visit Diagnoses     Insomnia, unspecified type    -  Primary    Relevant Medications    melatonin (MELATIN) 3 mg tablet          Sergio Tenorio MD  [facility]   Department of Neurology    Patient note was created using MModal Dictation.  Any errors in syntax or even information may not have been identified and edited on initial review prior to signing this note.  Subjective:        HPI:   Ms. Greta Lyon is a 40 y.o. female presenting in follow up for epilepsy. The patient has been seizure-free since her last visit and continues on Lamictal with no side effects.  She does endorse mild insomnia stating that this may be related to mild anxiety.  She has no other complaints  "today.     Seizure Type: Unknown  Seizure Etiology: Unknown  Current AEDs: Lamictal 100 mg daily     The patient is accompanied by family who contribute to the history. This patient has 1 types of seizure as described below. The patient reports having seizures for years. The patient reports to have  seizure control. The seizure frequency is ~1 every 1-2 years. The last seizure was in 2016 years ago . The patient reports no side effects from seizure medication.     Seizure Type 1:   Seizure Description: Generalized shaking, looks like gasping for air, last 30s-minute, may 1-2  Aura: "Feel spacey" "nothing make sense"  Associated Symptoms: Bites tongue, urinary incontinence  Seizure Frequency: Monthly, prior that 1/yr  Last seizure: 2016     Handedness: Left  Seizure Triggers/ Provoking Features: stress   Seizure Onset Age: 14  Seizure/ Epilepsy Risk Factors: Paternal grandmother has epilepsy  Birth/Developmental History: Normal birth and developmental history  Previous Seizure Medications: PB, PHT, CBZ, LTG, LEV  Other Treatments: None  Episodes of Status: None  Recent Med Changes: None  Psychiatric/Behavioral Comorbitidies: None  Surgical Candidacy: None     Prior Studies:  EEG : Ambulatory EEG done 2 years- normal per patient  vEEG/ EMU evaluation: Not done  MRI of brain: Done per patient, normal per patient  Other studies: Not done  AED levels: 3.2 LTG 8/2018  CT/CTA Scan:  Not dome  PET Scan: Not done  Neuropsychological Evaluation: Not done  DEXA Scan: Not done     PAST MEDICAL HISTORY:  Past Medical History:   Diagnosis Date    Epilepsy     Seizures        PAST SURGICAL HISTORY:  History reviewed. No pertinent surgical history.    CURRENT MEDS:  Current Outpatient Medications   Medication Sig Dispense Refill    escitalopram oxalate (LEXAPRO) 10 MG tablet Take 1 tablet (10 mg total) by mouth once daily. 30 tablet 1    etodolac (LODINE XL) 400 MG 24 hr tablet Take 1 tablet (400 mg total) by mouth daily as " needed (avoid all other NSAIDs). 30 tablet 1    lamoTRIgine (LAMICTAL) 100 MG tablet Take 1 tablet (100 mg total) by mouth once daily. 90 tablet 3    melatonin (MELATIN) 3 mg tablet Take 1 tablet (3 mg total) by mouth every evening. 90 tablet 3     No current facility-administered medications for this visit.        ALLERGIES:  Review of patient's allergies indicates:   Allergen Reactions    Penicillins Hives       FAMILY HISTORY:  Family History   Problem Relation Age of Onset    Emphysema Mother     Epilepsy Paternal Grandmother     Cancer Paternal Grandmother        SOCIAL HISTORY:  Social History     Tobacco Use    Smoking status: Never Smoker    Smokeless tobacco: Never Used   Substance Use Topics    Alcohol use: No    Drug use: No       Review of Systems:  12 system review of systems is negative except for the symptoms mentioned in HPI.      Objective:   There were no vitals filed for this visit.  General: NAD, well nourished   Eyes: no tearing, discharge, no erythema   ENT: moist mucous membranes of the oral cavity, nares patent    Neck: Supple, full range of motion  Cardiovascular: Appears well perfused  Lungs: Normal work of breathing, normal chest wall excursions  Skin: No rash, lesions, or breakdown on exposed skin  Psychiatry: Mood and affect are appropriate   Abdomen: nondistended, non tender  Extremeties: No cyanosis, clubbing or edema visible    Neurological   MENTAL STATUS: Alert and oriented to person, place, and time. Attention and concentration within normal limits. Speech without dysarthria, able to name and repeat without difficulty. Recent and remote memory within normal limits   CRANIAL NERVES: Visual fields intact. PERRL. EOMI. Facial sensation intact. Face symmetrical. Hearing grossly intact. Full shoulder shrug bilaterally. Tongue protrudes midline   SENSORY: Sensation is intact to light touch throughout.  MOTOR: Normal bulk. 5/5 deltoid, biceps, triceps, interosseous, hand   bilaterally. 5/5 iliopsoas, knee extension/flexion, foot dorsi/plantarflexion bilaterally.    REFLEXES: Deferred due to virtual visit  CEREBELLAR/COORDINATION/GAIT: Gait steady with normal arm swing and stride length.  Normal rapid alternating movements.

## 2021-04-05 ENCOUNTER — PATIENT MESSAGE (OUTPATIENT)
Dept: RESEARCH | Facility: HOSPITAL | Age: 41
End: 2021-04-05

## 2021-12-16 ENCOUNTER — OFFICE VISIT (OUTPATIENT)
Dept: NEUROLOGY | Facility: CLINIC | Age: 41
End: 2021-12-16
Payer: COMMERCIAL

## 2021-12-16 DIAGNOSIS — G40.909 SEIZURE DISORDER: ICD-10-CM

## 2021-12-16 PROCEDURE — 99214 PR OFFICE/OUTPT VISIT, EST, LEVL IV, 30-39 MIN: ICD-10-PCS | Mod: 95,,, | Performed by: PSYCHIATRY & NEUROLOGY

## 2021-12-16 PROCEDURE — 99214 OFFICE O/P EST MOD 30 MIN: CPT | Mod: 95,,, | Performed by: PSYCHIATRY & NEUROLOGY

## 2021-12-16 RX ORDER — LAMOTRIGINE 100 MG/1
100 TABLET ORAL DAILY
Qty: 90 TABLET | Refills: 3 | Status: SHIPPED | OUTPATIENT
Start: 2021-12-16 | End: 2022-12-15

## 2022-11-29 ENCOUNTER — TELEPHONE (OUTPATIENT)
Dept: OBSTETRICS AND GYNECOLOGY | Facility: CLINIC | Age: 42
End: 2022-11-29
Payer: COMMERCIAL

## 2022-12-07 ENCOUNTER — OFFICE VISIT (OUTPATIENT)
Dept: OBSTETRICS AND GYNECOLOGY | Facility: CLINIC | Age: 42
End: 2022-12-07
Payer: COMMERCIAL

## 2022-12-07 VITALS — WEIGHT: 114 LBS | DIASTOLIC BLOOD PRESSURE: 75 MMHG | BODY MASS INDEX: 22.26 KG/M2 | SYSTOLIC BLOOD PRESSURE: 111 MMHG

## 2022-12-07 DIAGNOSIS — Z30.432 ENCOUNTER FOR REMOVAL OF INTRAUTERINE CONTRACEPTIVE DEVICE (IUD): ICD-10-CM

## 2022-12-07 DIAGNOSIS — Z01.419 WELL WOMAN EXAM WITH ROUTINE GYNECOLOGICAL EXAM: Primary | ICD-10-CM

## 2022-12-07 DIAGNOSIS — D22.9 CHANGE IN SKIN MOLE: ICD-10-CM

## 2022-12-07 PROCEDURE — 1160F RVW MEDS BY RX/DR IN RCRD: CPT | Mod: CPTII,S$GLB,, | Performed by: STUDENT IN AN ORGANIZED HEALTH CARE EDUCATION/TRAINING PROGRAM

## 2022-12-07 PROCEDURE — 1159F PR MEDICATION LIST DOCUMENTED IN MEDICAL RECORD: ICD-10-PCS | Mod: CPTII,S$GLB,, | Performed by: STUDENT IN AN ORGANIZED HEALTH CARE EDUCATION/TRAINING PROGRAM

## 2022-12-07 PROCEDURE — 3008F PR BODY MASS INDEX (BMI) DOCUMENTED: ICD-10-PCS | Mod: CPTII,S$GLB,, | Performed by: STUDENT IN AN ORGANIZED HEALTH CARE EDUCATION/TRAINING PROGRAM

## 2022-12-07 PROCEDURE — 3078F PR MOST RECENT DIASTOLIC BLOOD PRESSURE < 80 MM HG: ICD-10-PCS | Mod: CPTII,S$GLB,, | Performed by: STUDENT IN AN ORGANIZED HEALTH CARE EDUCATION/TRAINING PROGRAM

## 2022-12-07 PROCEDURE — 58301 REMOVE INTRAUTERINE DEVICE: CPT | Mod: S$GLB,,, | Performed by: STUDENT IN AN ORGANIZED HEALTH CARE EDUCATION/TRAINING PROGRAM

## 2022-12-07 PROCEDURE — 3078F DIAST BP <80 MM HG: CPT | Mod: CPTII,S$GLB,, | Performed by: STUDENT IN AN ORGANIZED HEALTH CARE EDUCATION/TRAINING PROGRAM

## 2022-12-07 PROCEDURE — 1159F MED LIST DOCD IN RCRD: CPT | Mod: CPTII,S$GLB,, | Performed by: STUDENT IN AN ORGANIZED HEALTH CARE EDUCATION/TRAINING PROGRAM

## 2022-12-07 PROCEDURE — 99999 PR PBB SHADOW E&M-EST. PATIENT-LVL III: CPT | Mod: PBBFAC,,, | Performed by: STUDENT IN AN ORGANIZED HEALTH CARE EDUCATION/TRAINING PROGRAM

## 2022-12-07 PROCEDURE — 88175 CYTOPATH C/V AUTO FLUID REDO: CPT | Performed by: STUDENT IN AN ORGANIZED HEALTH CARE EDUCATION/TRAINING PROGRAM

## 2022-12-07 PROCEDURE — 3074F SYST BP LT 130 MM HG: CPT | Mod: CPTII,S$GLB,, | Performed by: STUDENT IN AN ORGANIZED HEALTH CARE EDUCATION/TRAINING PROGRAM

## 2022-12-07 PROCEDURE — 58301 REMOVAL OF IUD: ICD-10-PCS | Mod: S$GLB,,, | Performed by: STUDENT IN AN ORGANIZED HEALTH CARE EDUCATION/TRAINING PROGRAM

## 2022-12-07 PROCEDURE — 99999 PR PBB SHADOW E&M-EST. PATIENT-LVL III: ICD-10-PCS | Mod: PBBFAC,,, | Performed by: STUDENT IN AN ORGANIZED HEALTH CARE EDUCATION/TRAINING PROGRAM

## 2022-12-07 PROCEDURE — 99386 PREV VISIT NEW AGE 40-64: CPT | Mod: 25,S$GLB,, | Performed by: STUDENT IN AN ORGANIZED HEALTH CARE EDUCATION/TRAINING PROGRAM

## 2022-12-07 PROCEDURE — 3008F BODY MASS INDEX DOCD: CPT | Mod: CPTII,S$GLB,, | Performed by: STUDENT IN AN ORGANIZED HEALTH CARE EDUCATION/TRAINING PROGRAM

## 2022-12-07 PROCEDURE — 1160F PR REVIEW ALL MEDS BY PRESCRIBER/CLIN PHARMACIST DOCUMENTED: ICD-10-PCS | Mod: CPTII,S$GLB,, | Performed by: STUDENT IN AN ORGANIZED HEALTH CARE EDUCATION/TRAINING PROGRAM

## 2022-12-07 PROCEDURE — 3074F PR MOST RECENT SYSTOLIC BLOOD PRESSURE < 130 MM HG: ICD-10-PCS | Mod: CPTII,S$GLB,, | Performed by: STUDENT IN AN ORGANIZED HEALTH CARE EDUCATION/TRAINING PROGRAM

## 2022-12-07 PROCEDURE — 87624 HPV HI-RISK TYP POOLED RSLT: CPT | Performed by: STUDENT IN AN ORGANIZED HEALTH CARE EDUCATION/TRAINING PROGRAM

## 2022-12-07 PROCEDURE — 99386 PR PREVENTIVE VISIT,NEW,40-64: ICD-10-PCS | Mod: 25,S$GLB,, | Performed by: STUDENT IN AN ORGANIZED HEALTH CARE EDUCATION/TRAINING PROGRAM

## 2022-12-07 RX ORDER — ETONOGESTREL AND ETHINYL ESTRADIOL VAGINAL RING .015; .12 MG/D; MG/D
1 RING VAGINAL
Qty: 1 EACH | Refills: 11 | Status: SHIPPED | OUTPATIENT
Start: 2022-12-07 | End: 2023-12-07

## 2022-12-07 NOTE — PROGRESS NOTES
CC: Well woman exam    HPI:  Greta Lyon is a 42 y.o. female  presents for a well woman exam.  She desires to have her Mirena IUD removed (placed 4.5 years ago) because of chronic pelvic pain/cramping with it in place. Would like to start nuvaring for contraception instead. Has mole on left breast that she says has changed in size/color.       Patient history:   Past Medical History:   Diagnosis Date    Epilepsy     Seizures      History reviewed. No pertinent surgical history.  OB History    Para Term  AB Living   1 1 1     1   SAB IAB Ectopic Multiple Live Births           1      # Outcome Date GA Lbr Luis/2nd Weight Sex Delivery Anes PTL Lv   1 Term      Vag-Spont   JAE       GYN  Menopausal: No  History of abnormal paps:  yes, reports had to have colpo and repeat paps about 5 years ago  Abnormal or postmenopausal bleeding: DENIES  History of abnormal mammograms: has not done yet     Family history of breast or ovarian cancer: DENIES  Any breast masses, pain, skin changes, or nipple discharge: DENIES  Possible recent STD exposure: denies  Contraception: Mirena    Pap: 2022, Done today  Mammogram:  ordered      Family History   Problem Relation Age of Onset    Emphysema Mother     Epilepsy Paternal Grandmother     Cancer Paternal Grandmother      Social History     Tobacco Use    Smoking status: Never    Smokeless tobacco: Never   Substance Use Topics    Alcohol use: No    Drug use: No     Allergies: Penicillins    Current Outpatient Medications:     escitalopram oxalate (LEXAPRO) 10 MG tablet, Take 1 tablet (10 mg total) by mouth once daily., Disp: 30 tablet, Rfl: 1    etodolac (LODINE XL) 400 MG 24 hr tablet, Take 1 tablet (400 mg total) by mouth daily as needed (avoid all other NSAIDs)., Disp: 30 tablet, Rfl: 1    etonogestreL-ethinyl estradioL (NUVARING) 0.12-0.015 mg/24 hr vaginal ring, Place 1 each vaginally every 21 days. Insert one (1) ring vaginally and leave in place for  three (3) weeks, then remove for one (1) week., Disp: 1 each, Rfl: 11    lamoTRIgine (LAMICTAL) 100 MG tablet, Take 1 tablet (100 mg total) by mouth once daily., Disp: 90 tablet, Rfl: 3    melatonin (MELATIN) 3 mg tablet, Take 1 tablet (3 mg total) by mouth every evening., Disp: 90 tablet, Rfl: 3       ROS:  GENERAL: Denies weight gain or weight loss. Feeling well overall.   SKIN: Denies rash or lesions.   HEAD: Denies head injury or headache.   NODES: Denies enlarged lymph nodes.   CHEST: Denies chest pain or shortness of breath.   CARDIOVASCULAR: Denies palpitations or left sided chest pain.   ABDOMEN: No abdominal pain, constipation, diarrhea, nausea, vomiting or rectal bleeding.   URINARY: No frequency, dysuria, hematuria, or burning on urination.  REPRODUCTIVE: See HPI.   BREASTS: The patient performs breast self-examination and denies pain, lumps, or nipple discharge.   HEMATOLOGIC: No easy bruisability or excessive bleeding.  MUSCULOSKELETAL: Denies joint pain or swelling.   NEUROLOGIC: Denies syncope or weakness.   PSYCHIATRIC: Denies depression, anxiety or mood swings.    Objective:   /75   Wt 51.7 kg (113 lb 15.7 oz)   LMP 11/23/2022   BMI 22.26 kg/m²       Physical Exam:  APPEARANCE: Well nourished, well developed, in no acute distress.  AFFECT: WNL, alert and oriented x 3  SKIN: No acne or hirsutism  NECK: Neck symmetric without masses or thyromegaly  NODES: No inguinal, cervical, axillary, or femoral lymph node enlargement  CHEST: Good respiratory effect  ABDOMEN: Soft.  No tenderness or masses.  No hepatosplenomegaly.  No hernias.  BREASTS: Symmetrical, no skin changes or visible lesions.  No palpable masses, nipple discharge bilaterally. LLQ skin mole approx 1cm in size, with central darkening and dry/scaling (pt reports this is a change from previous)  PELVIC: Normal external genitalia without lesions.  Normal hair distribution.  Adequate perineal body, normal urethral meatus.  Vagina moist  and well rugated without lesions or discharge.  Cervix pink, without lesions, discharge or tenderness. IUD strings visualized. No significant cystocele or rectocele.  Bimanual exam shows uterus to be normal size, regular, mobile and nontender.  Adnexa without masses or tenderness.  EXTREMITIES: No edema.    ASSESSMENT AND PLAN  1. Well woman exam with routine gynecological exam  Liquid-Based Pap Smear, Screening    HPV High Risk Genotypes, PCR    Mammo Digital Screening Bilat w/ Tommy      2. Encounter for removal of intrauterine contraceptive device (IUD)  etonogestreL-ethinyl estradioL (NUVARING) 0.12-0.015 mg/24 hr vaginal ring      3. Change in skin mole  Ambulatory referral/consult to Dermatology          Annual exam  Breast and pelvic exam: wnl  Patient counseled on ASCCP guidelines for cervical cytology screening  Cervical screening: completed today   Patient counseled on current recommendations for breast cancer screening  Mammogram screening: ordered  STD testing: not requested today   Contraception: patient desires IUD removal and to start nuvaring, rx sent to patient pharmacy   Osteoporosis screening at 65  Tobacco cessation counseling n/a    2. IUD removal   - completed today without complication, see procedure note     3. Skin mole change   - referral to dermatology for evaluation and biopsy/removal as indicated       She was counseled to follow up with her PCP for other routine health maintenance      Follow up in about 1 year (around 12/7/2023).      Amber Valencia MD  OBGYN [facility]

## 2022-12-08 ENCOUNTER — TELEPHONE (OUTPATIENT)
Dept: OBSTETRICS AND GYNECOLOGY | Facility: CLINIC | Age: 42
End: 2022-12-08
Payer: COMMERCIAL

## 2022-12-15 LAB
FINAL PATHOLOGIC DIAGNOSIS: NORMAL
Lab: NORMAL

## 2022-12-20 LAB
HPV HR 12 DNA SPEC QL NAA+PROBE: NEGATIVE
HPV16 AG SPEC QL: NEGATIVE
HPV18 DNA SPEC QL NAA+PROBE: NEGATIVE

## 2023-05-17 ENCOUNTER — OFFICE VISIT (OUTPATIENT)
Dept: NEUROLOGY | Facility: CLINIC | Age: 43
End: 2023-05-17
Payer: COMMERCIAL

## 2023-05-17 DIAGNOSIS — Z51.81 THERAPEUTIC DRUG MONITORING: Primary | ICD-10-CM

## 2023-05-17 DIAGNOSIS — G40.909 SEIZURE DISORDER: ICD-10-CM

## 2023-05-17 DIAGNOSIS — Z00.00 PREVENTATIVE HEALTH CARE: ICD-10-CM

## 2023-05-17 PROCEDURE — 99214 OFFICE O/P EST MOD 30 MIN: CPT | Mod: 95,,, | Performed by: PSYCHIATRY & NEUROLOGY

## 2023-05-17 PROCEDURE — 99214 PR OFFICE/OUTPT VISIT, EST, LEVL IV, 30-39 MIN: ICD-10-PCS | Mod: 95,,, | Performed by: PSYCHIATRY & NEUROLOGY

## 2023-05-17 RX ORDER — LAMOTRIGINE 100 MG/1
100 TABLET ORAL DAILY
Qty: 90 TABLET | Refills: 3 | Status: SHIPPED | OUTPATIENT
Start: 2023-05-17

## 2023-05-17 NOTE — PROGRESS NOTES
[facility]  Virtual Visit    NEUROLOGY  [facility], [place]    Date: 5/17/23  Patient Name: Greta Lyon   MRN: [id number]   PCP: Primary Doctor No  Referring Provider: No ref. provider found    The patient location is: Home  The chief complaint leading to consultation is: Epilepsy  Visit type: Virtual visit with synchronous audio and video  Total time spent with patient: 10 minutes  Each patient to whom he or she provides medical services by telemedicine is:  (1) informed of the relationship between the physician and patient and the respective role of any other health care provider with respect to management of the patient; and (2) notified that he or she may decline to receive medical services by telemedicine and may withdraw from such care at any time.    Notes:   Assessment:   Greta Lyon is a 43 y.o. female presenting presenting in follow-up for epilepsy. Continuing current lamictal with no changes. Obtaining therapeutic monitoring labs with additional routine screening labs for PCP.   Plan:     Problem List Items Addressed This Visit    None      Sergio Tenorio MD  [facility]   Department of Neurology    Patient note was created using MModal Dictation.  Any errors in syntax or even information may not have been identified and edited on initial review prior to signing this note.  Subjective:        HPI:   Ms. Greta Lyon is a 43 y.o. female presenting in follow up for epilepsy. She remains seizure free with no side effects. She has no new complaints. Today she mentions she has no primary care physician and states she is interested in establishing care with one.      Seizure Type: Unknown  Seizure Etiology: Unknown  Current AEDs: Lamictal 100 mg daily     The patient is accompanied by family who contribute to the history. This patient has 1 types of seizure as described below. The patient reports having seizures for years. The patient reports to  "have  seizure control. The seizure frequency is ~1 every 1-2 years. The last seizure was in 2016 years ago . The patient reports no side effects from seizure medication.     Seizure Type 1:   Seizure Description: Generalized shaking, looks like gasping for air, last 30s-minute, may 1-2  Aura: "Feel spacey" "nothing make sense"  Associated Symptoms: Bites tongue, urinary incontinence  Seizure Frequency: Monthly, prior that 1/yr  Last seizure: 2016     Handedness: Left  Seizure Triggers/ Provoking Features: stress   Seizure Onset Age: 14  Seizure/ Epilepsy Risk Factors: Paternal grandmother has epilepsy  Birth/Developmental History: Normal birth and developmental history  Previous Seizure Medications: PB, PHT, CBZ, LTG, LEV  Other Treatments: None  Episodes of Status: None  Recent Med Changes: None  Psychiatric/Behavioral Comorbitidies: None  Surgical Candidacy: None     Prior Studies:  EEG : Ambulatory EEG done 2 years- normal per patient  vEEG/ EMU evaluation: Not done  MRI of brain: Done per patient, normal per patient  Other studies: Not done  AED levels: 3.2 LTG 8/2018  CT/CTA Scan:  Not dome  PET Scan: Not done  Neuropsychological Evaluation: Not done  DEXA Scan: Not done     PAST MEDICAL HISTORY:  Past Medical History:   Diagnosis Date    Epilepsy     Seizures        PAST SURGICAL HISTORY:  No past surgical history on file.    CURRENT MEDS:  Current Outpatient Medications   Medication Sig Dispense Refill    escitalopram oxalate (LEXAPRO) 10 MG tablet Take 1 tablet (10 mg total) by mouth once daily. 30 tablet 1    etodolac (LODINE XL) 400 MG 24 hr tablet Take 1 tablet (400 mg total) by mouth daily as needed (avoid all other NSAIDs). 30 tablet 1    etonogestreL-ethinyl estradioL (NUVARING) 0.12-0.015 mg/24 hr vaginal ring Place 1 each vaginally every 21 days. Insert one (1) ring vaginally and leave in place for three (3) weeks, then remove for one (1) week. 1 each 11    lamoTRIgine (LAMICTAL) 100 MG tablet Take 1 " tablet (100 mg total) by mouth once daily. MUST MAKE APPOINTMENT FOR FURTHER REFILLS 90 tablet 0    melatonin (MELATIN) 3 mg tablet Take 1 tablet (3 mg total) by mouth every evening. 90 tablet 3     No current facility-administered medications for this visit.       ALLERGIES:  Review of patient's allergies indicates:   Allergen Reactions    Penicillins Hives       FAMILY HISTORY:  Family History   Problem Relation Age of Onset    Emphysema Mother     Epilepsy Paternal Grandmother     Cancer Paternal Grandmother      SOCIAL HISTORY:  Social History     Tobacco Use    Smoking status: Never    Smokeless tobacco: Never   Substance Use Topics    Alcohol use: No    Drug use: No       Review of Systems:  12 system review of systems is negative except for the symptoms mentioned in HPI.      Objective:   There were no vitals filed for this visit.  General: NAD, well nourished   Eyes: no tearing, discharge, no erythema   ENT: moist mucous membranes of the oral cavity, nares patent    Neck: Supple, full range of motion  Cardiovascular: Appears well perfused  Lungs: Normal work of breathing, normal chest wall excursions  Skin: No rash, lesions, or breakdown on exposed skin  Psychiatry: Mood and affect are appropriate   Abdomen: nondistended, non tender  Extremeties: No cyanosis, clubbing or edema visible    Neurological   MENTAL STATUS: Alert and oriented to person, place, and time. Attention and concentration within normal limits. Speech without dysarthria, able to name and repeat without difficulty. Recent and remote memory within normal limits   CRANIAL NERVES: Visual fields intact. PERRL. EOMI. Facial sensation intact. Face symmetrical. Hearing grossly intact. Full shoulder shrug bilaterally. Tongue protrudes midline   SENSORY: Sensation is intact to light touch throughout.  MOTOR: Normal bulk. 5/5 deltoid, biceps, triceps, interosseous, hand  bilaterally. 5/5 iliopsoas, knee extension/flexion, foot dorsi/plantarflexion  bilaterally.    REFLEXES: Deferred due to virtual visit  CEREBELLAR/COORDINATION/GAIT: Gross motor movements smooth.

## 2024-06-19 ENCOUNTER — PATIENT MESSAGE (OUTPATIENT)
Dept: NEUROLOGY | Facility: CLINIC | Age: 44
End: 2024-06-19
Payer: COMMERCIAL

## 2024-06-26 DIAGNOSIS — G40.909 SEIZURE DISORDER: ICD-10-CM

## 2024-06-26 RX ORDER — LAMOTRIGINE 100 MG/1
100 TABLET ORAL
Qty: 90 TABLET | Refills: 0 | Status: SHIPPED | OUTPATIENT
Start: 2024-06-26

## 2024-09-24 DIAGNOSIS — G40.909 SEIZURE DISORDER: ICD-10-CM

## 2024-09-25 DIAGNOSIS — G40.909 SEIZURE DISORDER: ICD-10-CM

## 2024-09-25 RX ORDER — LAMOTRIGINE 100 MG/1
100 TABLET ORAL DAILY
Qty: 90 TABLET | Refills: 0 | Status: SHIPPED | OUTPATIENT
Start: 2024-09-25

## 2024-09-25 RX ORDER — LAMOTRIGINE 100 MG/1
100 TABLET ORAL
Qty: 90 TABLET | Refills: 0 | Status: SHIPPED | OUTPATIENT
Start: 2024-09-25

## 2024-10-31 ENCOUNTER — OFFICE VISIT (OUTPATIENT)
Dept: NEUROLOGY | Facility: CLINIC | Age: 44
End: 2024-10-31
Payer: COMMERCIAL

## 2024-10-31 DIAGNOSIS — G40.909 SEIZURE DISORDER: ICD-10-CM

## 2024-10-31 PROCEDURE — 1160F RVW MEDS BY RX/DR IN RCRD: CPT | Mod: CPTII,95,, | Performed by: PSYCHIATRY & NEUROLOGY

## 2024-10-31 PROCEDURE — 1159F MED LIST DOCD IN RCRD: CPT | Mod: CPTII,95,, | Performed by: PSYCHIATRY & NEUROLOGY

## 2024-10-31 PROCEDURE — 99213 OFFICE O/P EST LOW 20 MIN: CPT | Mod: 95,,, | Performed by: PSYCHIATRY & NEUROLOGY

## 2024-10-31 RX ORDER — LAMOTRIGINE 100 MG/1
100 TABLET ORAL DAILY
Qty: 90 TABLET | Refills: 3 | Status: SHIPPED | OUTPATIENT
Start: 2024-10-31

## 2024-12-20 DIAGNOSIS — G40.909 SEIZURE DISORDER: ICD-10-CM

## 2024-12-24 RX ORDER — LAMOTRIGINE 100 MG/1
100 TABLET ORAL
Qty: 90 TABLET | Refills: 0 | Status: SHIPPED | OUTPATIENT
Start: 2024-12-24

## 2025-06-27 DIAGNOSIS — G40.909 SEIZURE DISORDER: ICD-10-CM

## 2025-06-27 RX ORDER — LAMOTRIGINE 100 MG/1
100 TABLET ORAL DAILY
Qty: 90 TABLET | Refills: 0 | Status: SHIPPED | OUTPATIENT
Start: 2025-06-27

## 2025-06-27 NOTE — TELEPHONE ENCOUNTER
Last Ordered 10/31/2024    Last Appointment:10/31/2024    Upcoming Appointment: no up coming appt

## 2025-06-29 ENCOUNTER — HOSPITAL ENCOUNTER (EMERGENCY)
Facility: HOSPITAL | Age: 45
Discharge: HOME | End: 2025-06-29
Attending: STUDENT IN AN ORGANIZED HEALTH CARE EDUCATION/TRAINING PROGRAM
Payer: COMMERCIAL

## 2025-06-29 VITALS
OXYGEN SATURATION: 100 % | SYSTOLIC BLOOD PRESSURE: 109 MMHG | HEART RATE: 83 BPM | DIASTOLIC BLOOD PRESSURE: 63 MMHG | TEMPERATURE: 98 F | RESPIRATION RATE: 16 BRPM

## 2025-06-29 DIAGNOSIS — R56.9 SEIZURE: Primary | ICD-10-CM

## 2025-06-29 LAB
ABSOLUTE EOSINOPHIL (OHS): 0.05 K/UL
ABSOLUTE MONOCYTE (OHS): 0.85 K/UL (ref 0.3–1)
ABSOLUTE NEUTROPHIL COUNT (OHS): 4.26 K/UL (ref 1.8–7.7)
ALBUMIN SERPL BCP-MCNC: 3.9 G/DL (ref 3.5–5.2)
ALP SERPL-CCNC: 55 UNIT/L (ref 40–150)
ALT SERPL W/O P-5'-P-CCNC: 16 UNIT/L (ref 10–44)
ANION GAP (OHS): 9 MMOL/L (ref 8–16)
AST SERPL-CCNC: 21 UNIT/L (ref 11–45)
B-HCG UR QL: NEGATIVE
BACTERIA #/AREA URNS AUTO: ABNORMAL /HPF
BASOPHILS # BLD AUTO: 0.06 K/UL
BASOPHILS NFR BLD AUTO: 0.9 %
BILIRUB SERPL-MCNC: 0.5 MG/DL (ref 0.1–1)
BILIRUB UR QL STRIP.AUTO: NEGATIVE
BUN SERPL-MCNC: 7 MG/DL (ref 6–20)
CALCIUM SERPL-MCNC: 9.2 MG/DL (ref 8.7–10.5)
CHLORIDE SERPL-SCNC: 107 MMOL/L (ref 95–110)
CLARITY UR: CLEAR
CO2 SERPL-SCNC: 23 MMOL/L (ref 23–29)
COLOR UR AUTO: COLORLESS
CREAT SERPL-MCNC: 0.8 MG/DL (ref 0.5–1.4)
CTP QC/QA: YES
ERYTHROCYTE [DISTWIDTH] IN BLOOD BY AUTOMATED COUNT: 12.1 % (ref 11.5–14.5)
GFR SERPLBLD CREATININE-BSD FMLA CKD-EPI: >60 ML/MIN/1.73/M2
GLUCOSE SERPL-MCNC: 99 MG/DL (ref 70–110)
GLUCOSE UR QL STRIP: NEGATIVE
HCT VFR BLD AUTO: 35.5 % (ref 37–48.5)
HGB BLD-MCNC: 11.6 GM/DL (ref 12–16)
HGB UR QL STRIP: ABNORMAL
HYALINE CASTS UR QL AUTO: 15 /LPF (ref 0–1)
IMM GRANULOCYTES # BLD AUTO: 0.02 K/UL (ref 0–0.04)
IMM GRANULOCYTES NFR BLD AUTO: 0.3 % (ref 0–0.5)
KETONES UR QL STRIP: ABNORMAL
LEUKOCYTE ESTERASE UR QL STRIP: NEGATIVE
LYMPHOCYTES # BLD AUTO: 1.68 K/UL (ref 1–4.8)
MAGNESIUM SERPL-MCNC: 1.8 MG/DL (ref 1.6–2.6)
MCH RBC QN AUTO: 28.6 PG (ref 27–31)
MCHC RBC AUTO-ENTMCNC: 32.7 G/DL (ref 32–36)
MCV RBC AUTO: 88 FL (ref 82–98)
MICROSCOPIC COMMENT: ABNORMAL
NITRITE UR QL STRIP: NEGATIVE
NUCLEATED RBC (/100WBC) (OHS): 0 /100 WBC
PH UR STRIP: 5 [PH]
PLATELET # BLD AUTO: 226 K/UL (ref 150–450)
PMV BLD AUTO: 10.6 FL (ref 9.2–12.9)
POTASSIUM SERPL-SCNC: 4 MMOL/L (ref 3.5–5.1)
PROT SERPL-MCNC: 7.5 GM/DL (ref 6–8.4)
PROT UR QL STRIP: ABNORMAL
RBC # BLD AUTO: 4.05 M/UL (ref 4–5.4)
RBC #/AREA URNS AUTO: 1 /HPF (ref 0–4)
RELATIVE EOSINOPHIL (OHS): 0.7 %
RELATIVE LYMPHOCYTE (OHS): 24.3 % (ref 18–48)
RELATIVE MONOCYTE (OHS): 12.3 % (ref 4–15)
RELATIVE NEUTROPHIL (OHS): 61.5 % (ref 38–73)
SODIUM SERPL-SCNC: 139 MMOL/L (ref 136–145)
SP GR UR STRIP: 1.02
SQUAMOUS #/AREA URNS AUTO: 4 /HPF
UROBILINOGEN UR STRIP-ACNC: NEGATIVE EU/DL
WBC # BLD AUTO: 6.92 K/UL (ref 3.9–12.7)
WBC #/AREA URNS AUTO: <1 /HPF (ref 0–5)
YEAST UR QL AUTO: ABNORMAL /HPF

## 2025-06-29 PROCEDURE — 83735 ASSAY OF MAGNESIUM: CPT | Performed by: NURSE PRACTITIONER

## 2025-06-29 PROCEDURE — 81025 URINE PREGNANCY TEST: CPT | Performed by: NURSE PRACTITIONER

## 2025-06-29 PROCEDURE — 99284 EMERGENCY DEPT VISIT MOD MDM: CPT | Mod: 25

## 2025-06-29 PROCEDURE — 80053 COMPREHEN METABOLIC PANEL: CPT | Performed by: NURSE PRACTITIONER

## 2025-06-29 PROCEDURE — 25000003 PHARM REV CODE 250

## 2025-06-29 PROCEDURE — 80175 DRUG SCREEN QUAN LAMOTRIGINE: CPT | Performed by: NURSE PRACTITIONER

## 2025-06-29 PROCEDURE — 81003 URINALYSIS AUTO W/O SCOPE: CPT | Performed by: NURSE PRACTITIONER

## 2025-06-29 PROCEDURE — 85025 COMPLETE CBC W/AUTO DIFF WBC: CPT | Performed by: NURSE PRACTITIONER

## 2025-06-29 RX ORDER — ACETAMINOPHEN 500 MG
1000 TABLET ORAL
Status: COMPLETED | OUTPATIENT
Start: 2025-06-29 | End: 2025-06-29

## 2025-06-29 RX ADMIN — ACETAMINOPHEN 1000 MG: 500 TABLET ORAL at 04:06

## 2025-06-29 NOTE — ED NOTES
Patient ambulated to the bathroom and back to her bed with the help of her  and daughter, provided urine sample, no acute distress voiced at this time.

## 2025-06-29 NOTE — ED NOTES
Patient laying down in bed comfortably states her headache is better but still there, no other complaints at this time, she is connected to the monitor, all vitals stable,  at bedside, will continue to monitor.

## 2025-06-29 NOTE — ED NOTES
Patient presented to the ED with complaints of seizure activity at home, per  at bedside describes the situation as she was sitting down on her knee, when he noticed that she was having a seizure that lasted for  one to two minutes, per patient she did not had any seizures in last 5 to 6 years, she has been complied with medications, patient also reports headache, and photophobia, no nausea, vomiting, no other complaints reported at this time, patient is alert, oriented x 4, all vitals stable, connected to the monitor, side rails up x 2, padding applied,  and daughter at bedside.

## 2025-06-29 NOTE — ED PROVIDER NOTES
Encounter Date: 6/29/2025       History     Chief Complaint   Patient presents with    Seizures     Pt brought in by ems lethargic, but oriented x4. Pt states she has a history of seizures, but has not had an episode for 6-7 years with medical management compliance. EMS states  described tonic colonic activity prior to arrival.      45-year-old female with a past medical history of epilepsy presenting to the ED via EMS following a seizure that occurred earlier today.  She has not had a seizure since 2016, per chart review of a visit with Neurology.  Her  at the bedside states that she was kneeling on the ground next to their couch playing with their dogs when she began have generalized clonic tonic activity.  He laid her on her side and believes the seizure lasted about 1-2 minutes.  No reported head injury or trauma.  She takes 100 mg of Lamictal daily for epilepsy.  She reports compliance with her medications.  She denies any recent illnesses or changes in medications.  She attributes this seizure to fatigue and states that she did not sleep last night because she was working until 2 in the morning.  She is alert and oriented at this time however postictal and is reporting headache and nausea.  She denies chest pain, shortness of breath, abdominal pain, nausea, vomiting, fever, chills, or any other symptoms at this time.    The history is provided by the patient and the spouse. No  was used.     Review of patient's allergies indicates:   Allergen Reactions    Penicillins Hives     Past Medical History:   Diagnosis Date    Epilepsy     Seizures      History reviewed. No pertinent surgical history.  Family History   Problem Relation Name Age of Onset    Emphysema Mother      Epilepsy Paternal Grandmother      Cancer Paternal Grandmother       Social History[1]  Review of Systems   Constitutional:  Positive for fatigue. Negative for chills and fever.   HENT:  Negative for congestion  and sore throat.    Eyes:  Negative for visual disturbance.   Respiratory:  Negative for shortness of breath.    Cardiovascular:  Negative for chest pain.   Gastrointestinal:  Positive for nausea. Negative for abdominal pain, diarrhea and vomiting.   Genitourinary:  Negative for dysuria.   Musculoskeletal:  Negative for neck pain and neck stiffness.   Skin:  Negative for rash.   Neurological:  Positive for seizures and headaches.   All other systems reviewed and are negative.      Physical Exam     Initial Vitals [06/29/25 1546]   BP Pulse Resp Temp SpO2   114/66 100 16 98.4 °F (36.9 °C) 100 %      MAP       --         Physical Exam    Vitals reviewed.  Constitutional: Vital signs are normal. She appears well-developed and well-nourished. She appears lethargic. She is not diaphoretic.  Non-toxic appearance. No distress.   HENT:   Head: Normocephalic and atraumatic.   Eyes: Conjunctivae and EOM are normal. Pupils are equal, round, and reactive to light.   Neck: Phonation normal. Neck supple.   Normal range of motion.  Cardiovascular:  Normal rate.           Pulmonary/Chest: Effort normal and breath sounds normal. No respiratory distress. She has no wheezes.   Abdominal: Abdomen is soft. She exhibits no distension. There is no abdominal tenderness.   Musculoskeletal:      Cervical back: Normal range of motion and neck supple. No rigidity.     Neurological: She is oriented to person, place, and time. She has normal strength. She appears lethargic. No cranial nerve deficit or sensory deficit. GCS score is 15. GCS eye subscore is 4. GCS verbal subscore is 5. GCS motor subscore is 6.   No drift. CN 2-12 grossly intact. Sensation intact.    Psychiatric: She has a normal mood and affect. Her behavior is normal. Judgment and thought content normal.         ED Course   Procedures  Labs Reviewed   CBC WITH DIFFERENTIAL - Abnormal       Result Value    WBC 6.92      RBC 4.05      HGB 11.6 (*)     HCT 35.5 (*)     MCV 88       MCH 28.6      MCHC 32.7      RDW 12.1      Platelet Count 226      MPV 10.6      Nucleated RBC 0      Neut % 61.5      Lymph % 24.3      Mono % 12.3      Eos % 0.7      Basophil % 0.9      Imm Grans % 0.3      Neut # 4.26      Lymph # 1.68      Mono # 0.85      Eos # 0.05      Baso # 0.06      Imm Grans # 0.02     URINALYSIS, REFLEX TO URINE CULTURE - Abnormal    Color, UA Colorless (*)     Appearance, UA Clear      pH, UA 5.0      Spec Grav UA 1.020      Protein, UA Trace (*)     Glucose, UA Negative      Ketones, UA 1+ (*)     Bilirubin, UA Negative      Blood, UA 1+ (*)     Nitrites, UA Negative      Urobilinogen, UA Negative      Leukocyte Esterase, UA Negative     URINALYSIS MICROSCOPIC - Abnormal    RBC, UA 1      WBC, UA <1      Bacteria, UA Occasional      Yeast, UA None      Squamous Epithelial Cells, UA 4      Hyaline Casts, UA 15 (*)     Microscopic Comment       COMPREHENSIVE METABOLIC PANEL - Normal    Sodium 139      Potassium 4.0      Chloride 107      CO2 23      Glucose 99      BUN 7      Creatinine 0.8      Calcium 9.2      Protein Total 7.5      Albumin 3.9      Bilirubin Total 0.5      ALP 55      AST 21      ALT 16      Anion Gap 9      eGFR >60     MAGNESIUM - Normal    Magnesium  1.8     CBC W/ AUTO DIFFERENTIAL    Narrative:     The following orders were created for panel order CBC auto differential.  Procedure                               Abnormality         Status                     ---------                               -----------         ------                     CBC with Differential[[id number]]       Abnormal            Final result                 Please view results for these tests on the individual orders.   LAMOTRIGINE LEVEL   GREY TOP URINE HOLD   POCT URINE PREGNANCY    POC Preg Test, Ur Negative       Acceptable Yes            Imaging Results              CT Head Without Contrast (Final result)  Result time 06/29/25 18:10:40      Final result by Moose Joyce  DO BISHOP (06/29/25 18:10:40)                   Impression:      No acute intracranial abnormality.      Electronically signed by: Moose Joyce  Date:    06/29/2025  Time:    18:10               Narrative:    EXAMINATION:  CT HEAD WITHOUT CONTRAST    CLINICAL HISTORY:  Seizure, new-onset, no history of trauma;    TECHNIQUE:  Low dose axial CT images obtained throughout the head without intravenous contrast. Sagittal and coronal reconstructions were performed.    COMPARISON:  None available.    FINDINGS:  Ventricles and sulci are normal in size for age without evidence of hydrocephalus. No extra-axial blood or fluid collections.  The brain parenchyma is normal. No parenchymal mass, hemorrhage, edema or major vascular distribution infarct.    No calvarial fracture.  The scalp is unremarkable.  Bilateral paranasal sinuses and mastoid air cells are clear.                                       Medications   acetaminophen tablet 1,000 mg (1,000 mg Oral Given 6/29/25 1618)     Medical Decision Making  Patient remained stable and seizure free throughout course of ED stay.  Afebrile.  Vitals within normal limits. Discussion with on-call neurologist advised UA and CT head without contrast in addition to basic labs. Physical exam unremarkable.  No abnormalities on labs or imaging.  Results discussed with patient.  After observation in ED for 3 hours patient did not have another seizure.  Neurology did not recommend any medication changes at this time and advised close follow up.  Reported improvement in headache following Tylenol given in ED. Stable for discharge.  Lamictal level pending.  Epilepsy safety discharge instructions given.  Advised close follow up with neurologist, Dr. Tenorio, as soon as possible.  Patient verbalized understanding and agreement with plan.  All questions answered.  I have discussed this patient with ED attending, Dr. Oneal, who is in agreement with ED course and dispo.    Differential Diagnosis  includes, but is not limited to:  Arrhythmia, aortic dissection, MI/unstable angina, PE, cardiogenic shock, CHF, CVA/TIA, intracranial lesion/mass, seizure, perforated viscous, ruptured AAA, orthostatic hypotension, vasovagal episode, anemia, dehydration, medication reaction, intentional overdose        Amount and/or Complexity of Data Reviewed  Labs: ordered. Decision-making details documented in ED Course.  Radiology: ordered. Decision-making details documented in ED Course.    Risk  OTC drugs.               ED Course as of 06/29/25 1952   Sun Jun 29, 2025   1608 BP: 114/66 [KG]   1608 Temp: 98.4 °F (36.9 °C) [KG]   1608 Pulse: 100 [KG]   1608 Resp: 16 [KG]   1608 SpO2: 100 % [KG]   1657 Comprehensive metabolic panel  No electrolyte abnormality. BUN/Cr wnl [KG]   1658 Magnesium : 1.8 [KG]   1658 POCT urine pregnancy [KG]   1658 CBC auto differential(!)  No elevation WBC. Platelets wnl. H/H just below normal [KG]   1726 Page sent to Neurology resident to call ED for consult [KG]   1747 Per neuro- no changes in meds as this time, follow up with Dr Tenorio outpatient. Also recs CT head and UA [DT]   1757 PROTEIN TOTAL: 7.5 [HL]   1815 CT Head Without Contrast  FINDINGS:  Ventricles and sulci are normal in size for age without evidence of hydrocephalus. No extra-axial blood or fluid collections.  The brain parenchyma is normal. No parenchymal mass, hemorrhage, edema or major vascular distribution infarct.     No calvarial fracture.  The scalp is unremarkable.  Bilateral paranasal sinuses and mastoid air cells are clear.     Impression:     No acute intracranial abnormality.   [KG]   1819 Urinalysis, Reflex to Urine Culture Urine, Clean Catch(!)  Unremarkable for UTI [KG]   1826 Urinalysis Microscopic(!) [KG]   1830 Patient remaining stable and comfortable on re-evaluation.  Informed of plan to observe for an additional 20 minutes prior to discharge as long as seizure free. [KG]      ED Course User Index  [DT] Yan  Kristie MCNEAL NP  [HL] Julissa Oneal DO  [KG] Ange Gómez PA-C                           Clinical Impression:  Final diagnoses:  [R56.9] Seizure (Primary)          ED Disposition Condition    Discharge Stable          ED Prescriptions    None       Follow-up Information       Follow up With Specialties Details Why Contact Info    Sergio Tenorio MD Neurology Schedule an appointment as soon as possible for a visit in 3 days  [address] [facility] [address]  [phone]                     [1]   Social History  Tobacco Use    Smoking status: Never    Smokeless tobacco: Never   Substance Use Topics    Alcohol use: No    Drug use: No        Ange Gómez PA-C  06/29/25 1953

## 2025-06-29 NOTE — PLAN OF CARE
[facility] Plan of Care Note    Date: 06/29/2025   Patient: Greta Melton  MRN: [id number]      45-year-old female with epilepsy, previously well-controlled on lamotrigine 100 mg daily and no reported seizures since 2016, now presenting with a single breakthrough generalized seizure. She has since returned to neurologic baseline and has a normal head CT. Lamotrigine level has been sent and is pending. She denies medication nonadherence and endorses poor sleep recently, which may have contributed to a lowered seizure threshold. Given reassuring ED workup and full return to baseline, no acute changes to her antiseizure regimen are recommended at this time. Discharge is reasonable with seizure precautions and prompt outpatient follow-up with her established neurologist for review of medication levels and consideration of further management. Outpatient EEG may also be considered if not previously done.    #Breakthrough seizure in patient with known epilepsy  - No acute changes to current antiseizure regimen (lamotrigine 100 mg daily) at this time.  - Lamotrigine level has been sent and is pending; recommend outpatient neurologist review once available.  - Discharge is reasonable given return to baseline and reassuring ED workup, including normal head CT.  - Recommend seizure precautions be reviewed with patient and family prior to discharge.  - Prompt outpatient neurology follow-up for possible medication adjustment and consideration of EEG if not previously completed.                Toby Menchaca MD  [facility] PGY-3

## 2025-06-29 NOTE — DISCHARGE INSTRUCTIONS
During a seizure:  - Ensure the person is in a safe place, remove hard or sharp objects from the area  - Turn the person on their side  - Never force anything into their mouth  - Keep on eye on the time, if the jerking/shaking/tensing of the muscles lasts > 5 minutes, call 911.   -if multiple seizures occur in a row without return back to baseline, 911 needs to be called.      After a seizure:  - Allow them to lie quietly, gently call them to see if they wake up  - If there is injury or if they are not waking up within 5 minutes, call 911     Safety:  - Take showers, not baths  - Take care when around bodies of water (swimming pools, lakes, etc) and wear protective equipment. Do not swim alone  - Use equipment with automatic shutoff safety features  - Do not climb ladders or use heavy machinery until seizure-free for 6 months  - Use the back burners when cooking  - If you have children, change diapers on the floor and avoid holding them while taking stairs  - Do not drive until seizure-free for 6 months      Triggers:  - Be sure to take you medication as scheduled without missed doses  - Be sure to get 8 hours of sleep each night  - Certain medications to avoid:          - Benadryl (diphenhydramine)          - Tramadol (Ultram)          - Certain antibiotics in the fluoroquinolone class (levaquin or cipro)          - Wellbutrin           - Chantix          - Alcohol          - Other illegal drugs or stimulant medications  - Herbal supplements to avoid that can lower the seizure threshold:              - Asafoetida, Borage, Ephedra, Ergot, Eucalyptus, Evening primrose, Ginkgo biloba, Ginseng, Pennyroyal, Doug, Shankpushpi, Star anise, Star fruit, Wormwood, and Yohimbine     Do not miss any doses of medication. If a dose of medication is missed, take it as soon as it is remembered even if that means doubling up on the dose. Get regular sleep. Go to sleep at the same time and wake up at the same time every day. People  with epilepsy require more sleep than people without epilepsy.  Sleeping 10-12 hours a day can be normal for a person with epilepsy.      Every seizure makes it harder to prevent the next seizure. Epilepsy is associated with SUDEP, or sudden unexpected death in epilepsy.  The risk is significantly higher if convulsive seizures are not well controlled. For more information, check out these websites: https://www.epilepsy.com/, https://www.epilepsyalliance[place].org/, www.renata-epilepsy.org, www.womenandepilepsy.org.      If you are interested in meeting other individuals in our epilepsy community, please reach out to the [facility] [place] ([phone], [phone], info@epileps[place].org).  They organize many informative and fun activities in the region.  They can provide you invaluable information on how to get access to resources available for patients living with epilepsy as well as a rich community of like-minded individuals who are all learning to cope with the same issues.  It is very important to remember, you are not alone.      Per [place] law, no episodes of loss of consciousness for 6 months before driving.  Avoid dangerous situations.  For example, no baths/pools alone, no heights, no power tools.  Wear a bike helmet.      Thank you for letting me care for you today - it was nice to meet you and I hope you feel better soon.     Our goal at [facility] is to always give you outstanding care and exceptional service. You may receive a survey by mail or email in the next week about your experience in our ED. We would greatly appreciate you completing and returning the survey. Your feedback provides us with a way to recognize our staff who give very good care and it helps us learn how to improve when your experience was below our aspiration of excellence.     All the best,     Ange Gómez PA-C  Emergency Department Physician Assistant  [facility]

## 2025-06-30 NOTE — ED NOTES
Patient laying down in bed comfortably, states she is feeling better, no new seizures activity reported at this time, patient is alert, oriented x 4, no acute distress noticed or voiced by the patient,  at bedside, all vitals stable, will continue to monitor.

## 2025-07-01 ENCOUNTER — TELEPHONE (OUTPATIENT)
Dept: EMERGENCY MEDICINE | Facility: HOSPITAL | Age: 45
End: 2025-07-01
Payer: COMMERCIAL

## 2025-07-02 LAB — W LAMOTRIGINE: 3.6 UG/ML

## 2025-07-07 ENCOUNTER — TELEPHONE (OUTPATIENT)
Dept: EMERGENCY MEDICINE | Facility: HOSPITAL | Age: 45
End: 2025-07-07
Payer: COMMERCIAL

## 2025-07-07 DIAGNOSIS — R31.29 MICROSCOPIC HEMATURIA: Primary | ICD-10-CM

## 2025-07-07 DIAGNOSIS — D64.9 ANEMIA, UNSPECIFIED TYPE: ICD-10-CM

## 2025-07-07 NOTE — TELEPHONE ENCOUNTER
Patient informed of low hemoglobin level, necessitating follow up PCP.  No further management at this time.  Answered questions with regard to anemia.  Last menstrual cycle was about 3 weeks ago.    Urinalysis showed ketones, trace protein and 1+ blood.  Also explained that that could be due to seizure activity, dehydration and recent menstrual cycle but would require repeat urinalysis with primary care provider.    No further questions from the patient.  Currently feeling well.

## 2025-07-11 DIAGNOSIS — Z30.432 ENCOUNTER FOR REMOVAL OF INTRAUTERINE CONTRACEPTIVE DEVICE (IUD): ICD-10-CM

## 2025-07-12 NOTE — TELEPHONE ENCOUNTER
Refill Routing Note   Medication(s) are not appropriate for processing by [facility] for the following reason(s):        Patient not seen by provider within 15 months    [facility] action(s):  Defer               Appointments  past 12m or future 3m with PCP    Date Provider   Last Visit   12/7/2022 Amber Valencia MD   Next Visit   Visit date not found Amber Valencia MD   ED visits in past 90 days: 1        Note composed:7:41 PM 07/11/2025

## 2025-07-14 RX ORDER — ETONOGESTREL AND ETHINYL ESTRADIOL VAGINAL RING .015; .12 MG/D; MG/D
1 RING VAGINAL
Qty: 3 EACH | Refills: 0 | Status: SHIPPED | OUTPATIENT
Start: 2025-07-14

## 2025-07-14 NOTE — TELEPHONE ENCOUNTER
Refill Routing Note   Medication(s) are not appropriate for processing by [facility] for the following reason(s):        Patient not seen by provider within 15 months    [facility] action(s):  Defer         Staff routed back to [facility]ill Center. No OV has been made      Appointments  past 12m or future 3m with PCP    Date Provider   Last Visit   12/7/2022 Amber Valencia MD   Next Visit   Visit date not found Amber Valencia MD   ED visits in past 90 days: 1        Note composed:8:34 AM 07/14/2025